# Patient Record
Sex: FEMALE | Race: WHITE | NOT HISPANIC OR LATINO | Employment: OTHER | URBAN - METROPOLITAN AREA
[De-identification: names, ages, dates, MRNs, and addresses within clinical notes are randomized per-mention and may not be internally consistent; named-entity substitution may affect disease eponyms.]

---

## 2023-02-27 ENCOUNTER — TELEPHONE (OUTPATIENT)
Dept: HEMATOLOGY/ONCOLOGY | Facility: CLINIC | Age: 68
End: 2023-02-27
Payer: MEDICARE

## 2023-02-27 DIAGNOSIS — R91.8 LUNG MASS: Primary | ICD-10-CM

## 2023-02-27 DIAGNOSIS — R91.8 LUNG MASS: ICD-10-CM

## 2023-02-28 NOTE — NURSING
Patient notified of the scheduled appointment with Dr. Arevalo.  Referral and recent PFTs uploaded to .  Images requested via fax to be powershared.  Patient has an appointment on 02/06/23 and will also request a copy of images on a disc.  Provided Ms. Ferro with my direct contact information.  Appointment reminder mailed.  Oncology Navigation   Intake  Date of Diagnosis: 02/13/23  Cancer Type: Thoracic  Internal / External Referral: External  Date of Referral: 02/27/23  Initial Nurse Navigator Contact: 02/27/23  Referral to Initial Contact Timeline (days): 0  Date Worked: 02/28/23  First Appointment Available: 03/03/23  Appointment Date: 03/08/23  First Available Date vs. Scheduled Date (days): 5  Multiple appointments: No  Reason if booked > 7 days after scheduling: Patient request; Additional tests/procedures     Treatment  Current Status: Active    Surgical Oncologist: Irina  Consult Date: 03/08/23    Medical Oncologist: Angela Khan       Procedures: CT; PET scan; Bronchoscopy  Bronchoscopy Schedule Date: 02/10/23  CT Schedule Date: 01/25/23  PET Scan Schedule Date: 02/03/23             Barriers of Care: Transportation  Transportation Barriers: lives in [place].     Acuity      Follow Up  No follow-ups on file.

## 2023-03-07 NOTE — PROGRESS NOTES
History & Physical    SUBJECTIVE:     History of Present Illness:  Patient is a 67 y.o. female smoker with obesity, COPD, chronic pain syndrome, lumbosacral radiculopathy, lupus anticoagulant disorder, FM, and RLS who presents to clinic today for evaluation of RLL Squamous Cell Carcinoma. Patient referred to us by Dr. Ring following workup for severe cough which included LDCT obtained following appointment on 01/10/23. LDCT 23 revealed RLL lung mass (previous LDCT screening 2020 no evidence of malignancy). PET/CT 23 revealed hypermetabolic RLL lung mass. Patient subsequently underwent transbronchial biopsy 02/10/23; path: histological and immunohistochemical findings consisted with squamous cell carcinoma. Today patient reports SINGLETON which she attributes to her current weight and ago. Also reports cough 2/2 to post-nasal drip. Denies fever, chills, diaphoresis, unintentional weight loss, syncope, dizziness, CP.      PSH: spinal cord stimulation x2 (most recent 2022), Radiofrequency ablation of nerves innervating SI joint, cholecystectomy, , tonsillectomy, colonoscopy, Lumbar fusion (titanium hardware, laminectomy  Meds: cyclobenzaprine, tizanidine, lyrica, topiramate, denies A/C    Chief Complaint   Patient presents with    Consult       Review of patient's allergies indicates:  No Known Allergies    Current Outpatient Medications   Medication Sig Dispense Refill    busPIRone (BUSPAR) 10 MG tablet Take 10 mg by mouth 3 (three) times daily.      cyclobenzaprine (FLEXERIL) 10 MG tablet Take 10 mg by mouth 3 (three) times daily.      pregabalin (LYRICA) 300 MG Cap Take 300 mg by mouth 3 (three) times daily.      tiZANidine 4 mg Cap Take 4 mg by mouth 2 (two) times daily.      topiramate (TOPAMAX) 50 MG tablet Take 50 mg by mouth 2 (two) times daily.       No current facility-administered medications for this visit.       No past medical history on file.  No past surgical history on file.  No  "family history on file.        Review of Systems:  Review of Systems   Constitutional: Negative.    HENT:  Positive for postnasal drip. Negative for sneezing, sore throat and trouble swallowing.    Eyes: Negative.    Respiratory:  Positive for cough (secondary to post-nasal drip) and shortness of breath (with exertion). Negative for choking, chest tightness and stridor.    Cardiovascular:  Negative for chest pain, palpitations and leg swelling.   Gastrointestinal: Negative.    Endocrine: Negative.    Genitourinary: Negative.    Musculoskeletal:  Positive for back pain. Negative for gait problem, joint swelling, myalgias and neck pain.   Skin: Negative.    Allergic/Immunologic: Negative.    Neurological: Negative.    Hematological: Negative.    Psychiatric/Behavioral: Negative.       OBJECTIVE:     Vital Signs (Most Recent)  Pulse: 87 (03/08/23 1127)  BP: 136/84 (03/08/23 1127)  SpO2: 95 % (03/08/23 1127)  5' 3" (1.6 m)  73.2 kg (161 lb 6 oz)     Physical Exam:  Physical Exam  Constitutional:       Appearance: Normal appearance.   Eyes:      Extraocular Movements: Extraocular movements intact.      Pupils: Pupils are equal, round, and reactive to light.   Cardiovascular:      Rate and Rhythm: Normal rate and regular rhythm.      Pulses: Normal pulses.   Pulmonary:      Effort: Pulmonary effort is normal.      Breath sounds: Normal breath sounds.   Abdominal:      General: Abdomen is flat.      Palpations: Abdomen is soft.   Skin:     General: Skin is warm and dry.   Neurological:      General: No focal deficit present.      Mental Status: She is alert and oriented to person, place, and time. Mental status is at baseline.   Psychiatric:         Mood and Affect: Mood normal.         Behavior: Behavior normal.         Thought Content: Thought content normal.       Diagnostic Results:    PFTs - 02/20/23: FEV1 2.24 99.2% DLCO 56.1%    LDCT 01/25/23: RLL lung mass     PET/CT 02/02/23: hypermetabolic RLL lung mass "     ASSESSMENT/PLAN:     Patient is a 67 y.o. female smoker with obesity, COPD, chronic pain syndrome, lumbosacral radiculopathy, lupus anticoagulant disorder, FM, and RLS who presents to clinic today for evaluation of RLL Squamous Cell Carcinoma.  Chronic pain syndrome places patient at high risk of refractory postoperative pain    PLAN:Plan   Recommend robotic right lower lobectomy  Obtained consents for blood and robotic assisted right lower lobectomy with MLND. I have discussed the technical aspects, risks and benefits of the procedure with the patient.  I did inform the patient that the risks are the most common risks and that there are other less likely risks that are too numerous to elaborate.  The patient is aware and has agreed to undergo the procedure as detailed on the consent form.   Will call with surgery date.   Order Cardiac Stress Test in [place]

## 2023-03-08 ENCOUNTER — OFFICE VISIT (OUTPATIENT)
Dept: CARDIOTHORACIC SURGERY | Facility: CLINIC | Age: 68
End: 2023-03-08
Payer: MEDICARE

## 2023-03-08 VITALS
BODY MASS INDEX: 28.59 KG/M2 | DIASTOLIC BLOOD PRESSURE: 84 MMHG | OXYGEN SATURATION: 95 % | HEART RATE: 87 BPM | HEIGHT: 63 IN | WEIGHT: 161.38 LBS | SYSTOLIC BLOOD PRESSURE: 136 MMHG

## 2023-03-08 DIAGNOSIS — Z01.810 PRE-OPERATIVE CARDIOVASCULAR EXAMINATION: Primary | ICD-10-CM

## 2023-03-08 DIAGNOSIS — C34.31 MALIGNANT NEOPLASM OF LOWER LOBE OF RIGHT LUNG: ICD-10-CM

## 2023-03-08 PROCEDURE — 99205 OFFICE O/P NEW HI 60 MIN: CPT | Mod: S$PBB,,, | Performed by: THORACIC SURGERY (CARDIOTHORACIC VASCULAR SURGERY)

## 2023-03-08 PROCEDURE — 99999 PR PBB SHADOW E&M-EST. PATIENT-LVL III: CPT | Mod: PBBFAC,,, | Performed by: THORACIC SURGERY (CARDIOTHORACIC VASCULAR SURGERY)

## 2023-03-08 PROCEDURE — 99205 PR OFFICE/OUTPT VISIT, NEW, LEVL V, 60-74 MIN: ICD-10-PCS | Mod: S$PBB,,, | Performed by: THORACIC SURGERY (CARDIOTHORACIC VASCULAR SURGERY)

## 2023-03-08 PROCEDURE — 99213 OFFICE O/P EST LOW 20 MIN: CPT | Mod: PBBFAC | Performed by: THORACIC SURGERY (CARDIOTHORACIC VASCULAR SURGERY)

## 2023-03-08 PROCEDURE — 99999 PR PBB SHADOW E&M-EST. PATIENT-LVL III: ICD-10-PCS | Mod: PBBFAC,,, | Performed by: THORACIC SURGERY (CARDIOTHORACIC VASCULAR SURGERY)

## 2023-03-08 RX ORDER — PREGABALIN 300 MG/1
300 CAPSULE ORAL 3 TIMES DAILY
COMMUNITY

## 2023-03-08 RX ORDER — TOPIRAMATE 50 MG/1
50 TABLET, FILM COATED ORAL 2 TIMES DAILY
COMMUNITY

## 2023-03-08 RX ORDER — BUSPIRONE HYDROCHLORIDE 10 MG/1
10 TABLET ORAL 3 TIMES DAILY
COMMUNITY

## 2023-03-08 RX ORDER — CYCLOBENZAPRINE HCL 10 MG
10 TABLET ORAL 3 TIMES DAILY
COMMUNITY
Start: 2023-01-20

## 2023-03-08 RX ORDER — TIZANIDINE HYDROCHLORIDE 4 MG/1
4 CAPSULE, GELATIN COATED ORAL 2 TIMES DAILY
COMMUNITY

## 2023-03-15 DIAGNOSIS — C34.31 MALIGNANT NEOPLASM OF LOWER LOBE OF RIGHT LUNG: Primary | ICD-10-CM

## 2023-03-24 ENCOUNTER — TELEPHONE (OUTPATIENT)
Dept: CARDIOTHORACIC SURGERY | Facility: CLINIC | Age: 68
End: 2023-03-24
Payer: MEDICARE

## 2023-03-24 NOTE — TELEPHONE ENCOUNTER
Informed on 3/24/23 patient underwent a left heart cath in [place] on 3/23/23. I have no documentation of this procedure at this time. Per patient, they did not have any areas to intervene on, recommended medical management and safe to proceed with surgery.     I told Ms. Ferro she could come on Monday however if I did not receive documentation or did receive documentation and we still had concerns we would cancel her procedure - with the understanding she is driving in town from [place].     She verbalized understanding. Awaiting response from the cardiologist. Per medical records the final procedure report is not completed.     Will check Monday Monday.     CB

## 2023-03-26 ENCOUNTER — ANESTHESIA EVENT (OUTPATIENT)
Dept: SURGERY | Facility: HOSPITAL | Age: 68
DRG: 164 | End: 2023-03-26
Payer: MEDICARE

## 2023-03-26 NOTE — ANESTHESIA PREPROCEDURE EVALUATION
[facility]  Anesthesia Pre-Operative Evaluation         Patient Name: Cyndi Vu  YOB: 1955  MRN: [id number]    SUBJECTIVE:     Pre-operative evaluation for Procedure(s) (LRB):  XI ROBOTIC RATS,WITH LOBECTOMY,LUNG (Right)  LYMPHADENECTOMY (Right)     03/26/2023    Cyndi Vu is a 67 y.o. female w/ a significant PMHx of COPD, lupus anticoagulant disorder, chronic low back pain, and fibromyalgia presented for evaluation of RLL SCS. Decision made to pursue RLL lobectomy.    PFTs - 02/20/23: FEV1 2.24 99.2% DLCO 56.1%    Patient now presents for the above procedure(s).       Prev airway: None documented.      There is no problem list on file for this patient.      Review of patient's allergies indicates:  No Known Allergies    Current Inpatient Medications:      No current facility-administered medications on file prior to encounter.     Current Outpatient Medications on File Prior to Encounter   Medication Sig Dispense Refill    busPIRone (BUSPAR) 10 MG tablet Take 10 mg by mouth 3 (three) times daily.      pregabalin (LYRICA) 300 MG Cap Take 300 mg by mouth 3 (three) times daily.      topiramate (TOPAMAX) 50 MG tablet Take 50 mg by mouth 2 (two) times daily. PATIENT TAKING ONLY AT NIGHT      cyclobenzaprine (FLEXERIL) 10 MG tablet Take 10 mg by mouth 3 (three) times daily.      tiZANidine 4 mg Cap Take 4 mg by mouth 2 (two) times daily.         No past surgical history on file.    Social History:  Tobacco Use: Not on file      Alcohol Use: Not on file        OBJECTIVE:     Vital Signs Range (Last 24H):         Significant Labs:  No results found for: WBC, HGB, HCT, PLT, CHOL, TRIG, HDL, LDLDIRECT, ALT, AST, NA, K, CL, CREATININE, BUN, CO2, TSH, PSA, INR, GLUF, HGBA1C, MICROALBUR    Diagnostic Studies: No relevant studies.    EKG:   No results found for this or any previous visit.    2D ECHO:  TTE:  No results found for this or any previous  visit.    THOMAS:  No results found for this or any previous visit.    ASSESSMENT/PLAN:           Pre-op Assessment    I have reviewed the Patient Summary Reports.     I have reviewed the Nursing Notes. I have reviewed the NPO Status.   I have reviewed the Medications.     Review of Systems  Anesthesia Hx:  No problems with previous Anesthesia  History of prior surgery of interest to airway management or planning:  Denies Personal Hx of Anesthesia complications.   Hematology/Oncology:  Hematology Normal      Current/Recent Cancer.   EENT/Dental:EENT/Dental Normal   Cardiovascular:  Cardiovascular Normal     Pulmonary:   COPD    Renal/:  Renal/ Normal     Hepatic/GI:  Hepatic/GI Normal    Musculoskeletal:  Spine Disorders: lumbar    Neurological:   Chronic Pain Syndrome   Endocrine:  Endocrine Normal        Physical Exam  General: Well nourished, Alert and Oriented    Airway:  Mallampati: III / II  Mouth Opening: Normal  TM Distance: Normal  Tongue: Normal  Neck ROM: Normal ROM    Dental:  Edentulous    Chest/Lungs:  Clear to auscultation, Normal Respiratory Rate    Heart:  Rate: Normal  Rhythm: Regular Rhythm  Sounds: Normal        Anesthesia Plan  Type of Anesthesia, risks & benefits discussed:    Anesthesia Type: Gen ETT  Intra-op Monitoring Plan: Standard ASA Monitors and Art Line  Post Op Pain Control Plan: multimodal analgesia and IV/PO Opioids PRN  Induction:  IV  Airway Plan: Direct, Post-Induction  Informed Consent: Informed consent signed with the Patient and all parties understand the risks and agree with anesthesia plan.  All questions answered.   ASA Score: 3  Day of Surgery Review of History & Physical: H&P Update referred to the surgeon/provider.    Ready For Surgery From Anesthesia Perspective.     .

## 2023-03-27 ENCOUNTER — HOSPITAL ENCOUNTER (INPATIENT)
Facility: HOSPITAL | Age: 68
LOS: 3 days | Discharge: HOME | DRG: 164 | End: 2023-03-30
Attending: THORACIC SURGERY (CARDIOTHORACIC VASCULAR SURGERY) | Admitting: THORACIC SURGERY (CARDIOTHORACIC VASCULAR SURGERY)
Payer: MEDICARE

## 2023-03-27 ENCOUNTER — ANESTHESIA (OUTPATIENT)
Dept: SURGERY | Facility: HOSPITAL | Age: 68
DRG: 164 | End: 2023-03-27
Payer: MEDICARE

## 2023-03-27 DIAGNOSIS — C34.91 SQUAMOUS CELL CARCINOMA LUNG, RIGHT: Primary | ICD-10-CM

## 2023-03-27 DIAGNOSIS — C34.31 MALIGNANT NEOPLASM OF LOWER LOBE OF RIGHT LUNG: Primary | ICD-10-CM

## 2023-03-27 DIAGNOSIS — C34.91 NSCLC OF RIGHT LUNG: ICD-10-CM

## 2023-03-27 LAB
ABO + RH BLD: NORMAL
BLD GP AB SCN CELLS X3 SERPL QL: NORMAL
CREAT SERPL-MCNC: 1.3 MG/DL (ref 0.5–1.4)
ERYTHROCYTE [DISTWIDTH] IN BLOOD BY AUTOMATED COUNT: 14.8 % (ref 11.5–14.5)
EST. GFR  (NO RACE VARIABLE): 45.6 ML/MIN/1.73 M^2
HCT VFR BLD AUTO: 39.8 % (ref 37–48.5)
HGB BLD-MCNC: 13.2 G/DL (ref 12–16)
MCH RBC QN AUTO: 31.1 PG (ref 27–31)
MCHC RBC AUTO-ENTMCNC: 33.2 G/DL (ref 32–36)
MCV RBC AUTO: 94 FL (ref 82–98)
PLATELET # BLD AUTO: 266 K/UL (ref 150–450)
PMV BLD AUTO: 9.7 FL (ref 9.2–12.9)
RBC # BLD AUTO: 4.25 M/UL (ref 4–5.4)
SPECIMEN OUTDATE: NORMAL
WBC # BLD AUTO: 8.36 K/UL (ref 3.9–12.7)

## 2023-03-27 PROCEDURE — 99900035 HC TECH TIME PER 15 MIN (STAT)

## 2023-03-27 PROCEDURE — 88307 PR  SURG PATH,LEVEL V: ICD-10-PCS | Mod: 26,,, | Performed by: PATHOLOGY

## 2023-03-27 PROCEDURE — 25000003 PHARM REV CODE 250: Performed by: STUDENT IN AN ORGANIZED HEALTH CARE EDUCATION/TRAINING PROGRAM

## 2023-03-27 PROCEDURE — 88309 TISSUE EXAM BY PATHOLOGIST: CPT | Mod: 26,,, | Performed by: PATHOLOGY

## 2023-03-27 PROCEDURE — 36000712 HC OR TIME LEV V 1ST 15 MIN: Performed by: THORACIC SURGERY (CARDIOTHORACIC VASCULAR SURGERY)

## 2023-03-27 PROCEDURE — 63600175 PHARM REV CODE 636 W HCPCS: Performed by: STUDENT IN AN ORGANIZED HEALTH CARE EDUCATION/TRAINING PROGRAM

## 2023-03-27 PROCEDURE — 25000003 PHARM REV CODE 250: Performed by: THORACIC SURGERY (CARDIOTHORACIC VASCULAR SURGERY)

## 2023-03-27 PROCEDURE — 32663 PR THORACOSCOPY SURG LOBECTOMY: ICD-10-PCS | Mod: RT,,, | Performed by: THORACIC SURGERY (CARDIOTHORACIC VASCULAR SURGERY)

## 2023-03-27 PROCEDURE — 36000713 HC OR TIME LEV V EA ADD 15 MIN: Performed by: THORACIC SURGERY (CARDIOTHORACIC VASCULAR SURGERY)

## 2023-03-27 PROCEDURE — 32663 THORACOSCOPY W/LOBECTOMY: CPT | Mod: RT,,, | Performed by: THORACIC SURGERY (CARDIOTHORACIC VASCULAR SURGERY)

## 2023-03-27 PROCEDURE — 32674 PR THORACOSCOPY LYMPH NODE EXC: ICD-10-PCS | Mod: AS,,, | Performed by: PHYSICIAN ASSISTANT

## 2023-03-27 PROCEDURE — 71000015 HC POSTOP RECOV 1ST HR: Performed by: THORACIC SURGERY (CARDIOTHORACIC VASCULAR SURGERY)

## 2023-03-27 PROCEDURE — 32674 PR THORACOSCOPY LYMPH NODE EXC: ICD-10-PCS | Mod: ,,, | Performed by: THORACIC SURGERY (CARDIOTHORACIC VASCULAR SURGERY)

## 2023-03-27 PROCEDURE — D9220A PRA ANESTHESIA: ICD-10-PCS | Mod: ANES,,, | Performed by: ANESTHESIOLOGY

## 2023-03-27 PROCEDURE — 27201423 OPTIME MED/SURG SUP & DEVICES STERILE SUPPLY: Performed by: THORACIC SURGERY (CARDIOTHORACIC VASCULAR SURGERY)

## 2023-03-27 PROCEDURE — 20600001 HC STEP DOWN PRIVATE ROOM

## 2023-03-27 PROCEDURE — 36620 ARTERIAL: ICD-10-PCS | Mod: 59,,, | Performed by: ANESTHESIOLOGY

## 2023-03-27 PROCEDURE — 36620 INSERTION CATHETER ARTERY: CPT | Mod: 59,,, | Performed by: ANESTHESIOLOGY

## 2023-03-27 PROCEDURE — 37000009 HC ANESTHESIA EA ADD 15 MINS: Performed by: THORACIC SURGERY (CARDIOTHORACIC VASCULAR SURGERY)

## 2023-03-27 PROCEDURE — 25000003 PHARM REV CODE 250: Performed by: NURSE ANESTHETIST, CERTIFIED REGISTERED

## 2023-03-27 PROCEDURE — 32674 THORACOSCOPY LYMPH NODE EXC: CPT | Mod: AS,,, | Performed by: PHYSICIAN ASSISTANT

## 2023-03-27 PROCEDURE — 25000003 PHARM REV CODE 250

## 2023-03-27 PROCEDURE — 36415 COLL VENOUS BLD VENIPUNCTURE: CPT | Performed by: THORACIC SURGERY (CARDIOTHORACIC VASCULAR SURGERY)

## 2023-03-27 PROCEDURE — 94761 N-INVAS EAR/PLS OXIMETRY MLT: CPT

## 2023-03-27 PROCEDURE — D9220A PRA ANESTHESIA: Mod: CRNA,,, | Performed by: NURSE ANESTHETIST, CERTIFIED REGISTERED

## 2023-03-27 PROCEDURE — 27000221 HC OXYGEN, UP TO 24 HOURS

## 2023-03-27 PROCEDURE — 88307 TISSUE EXAM BY PATHOLOGIST: CPT | Mod: 59 | Performed by: PATHOLOGY

## 2023-03-27 PROCEDURE — C1729 CATH, DRAINAGE: HCPCS | Performed by: THORACIC SURGERY (CARDIOTHORACIC VASCULAR SURGERY)

## 2023-03-27 PROCEDURE — 71000016 HC POSTOP RECOV ADDL HR: Performed by: THORACIC SURGERY (CARDIOTHORACIC VASCULAR SURGERY)

## 2023-03-27 PROCEDURE — 88309 PR  SURG PATH,LEVEL VI: ICD-10-PCS | Mod: 26,,, | Performed by: PATHOLOGY

## 2023-03-27 PROCEDURE — C9290 INJ, BUPIVACAINE LIPOSOME: HCPCS | Performed by: THORACIC SURGERY (CARDIOTHORACIC VASCULAR SURGERY)

## 2023-03-27 PROCEDURE — 32674 THORACOSCOPY LYMPH NODE EXC: CPT | Mod: ,,, | Performed by: THORACIC SURGERY (CARDIOTHORACIC VASCULAR SURGERY)

## 2023-03-27 PROCEDURE — 71000033 HC RECOVERY, INTIAL HOUR: Performed by: THORACIC SURGERY (CARDIOTHORACIC VASCULAR SURGERY)

## 2023-03-27 PROCEDURE — 88307 TISSUE EXAM BY PATHOLOGIST: CPT | Mod: 26,,, | Performed by: PATHOLOGY

## 2023-03-27 PROCEDURE — 99900031 HC PATIENT EDUCATION (STAT)

## 2023-03-27 PROCEDURE — D9220A PRA ANESTHESIA: Mod: ANES,,, | Performed by: ANESTHESIOLOGY

## 2023-03-27 PROCEDURE — 32663 THORACOSCOPY W/LOBECTOMY: CPT | Mod: AS,RT,, | Performed by: PHYSICIAN ASSISTANT

## 2023-03-27 PROCEDURE — 37000008 HC ANESTHESIA 1ST 15 MINUTES: Performed by: THORACIC SURGERY (CARDIOTHORACIC VASCULAR SURGERY)

## 2023-03-27 PROCEDURE — 82565 ASSAY OF CREATININE: CPT | Performed by: THORACIC SURGERY (CARDIOTHORACIC VASCULAR SURGERY)

## 2023-03-27 PROCEDURE — 63600175 PHARM REV CODE 636 W HCPCS: Performed by: NURSE ANESTHETIST, CERTIFIED REGISTERED

## 2023-03-27 PROCEDURE — 88309 TISSUE EXAM BY PATHOLOGIST: CPT | Performed by: PATHOLOGY

## 2023-03-27 PROCEDURE — 88313 SPECIAL STAINS GROUP 2: CPT | Performed by: PATHOLOGY

## 2023-03-27 PROCEDURE — D9220A PRA ANESTHESIA: ICD-10-PCS | Mod: CRNA,,, | Performed by: NURSE ANESTHETIST, CERTIFIED REGISTERED

## 2023-03-27 PROCEDURE — 32663 PR THORACOSCOPY SURG LOBECTOMY: ICD-10-PCS | Mod: AS,RT,, | Performed by: PHYSICIAN ASSISTANT

## 2023-03-27 PROCEDURE — 86900 BLOOD TYPING SEROLOGIC ABO: CPT

## 2023-03-27 PROCEDURE — 63600175 PHARM REV CODE 636 W HCPCS: Performed by: THORACIC SURGERY (CARDIOTHORACIC VASCULAR SURGERY)

## 2023-03-27 PROCEDURE — 85027 COMPLETE CBC AUTOMATED: CPT | Performed by: THORACIC SURGERY (CARDIOTHORACIC VASCULAR SURGERY)

## 2023-03-27 RX ORDER — FENTANYL CITRATE 50 UG/ML
25 INJECTION, SOLUTION INTRAMUSCULAR; INTRAVENOUS EVERY 5 MIN PRN
Status: DISCONTINUED | OUTPATIENT
Start: 2023-03-27 | End: 2023-03-27 | Stop reason: HOSPADM

## 2023-03-27 RX ORDER — DEXAMETHASONE SODIUM PHOSPHATE 4 MG/ML
INJECTION, SOLUTION INTRA-ARTICULAR; INTRALESIONAL; INTRAMUSCULAR; INTRAVENOUS; SOFT TISSUE
Status: DISCONTINUED | OUTPATIENT
Start: 2023-03-27 | End: 2023-03-28

## 2023-03-27 RX ORDER — ACETAMINOPHEN 500 MG
1000 TABLET ORAL EVERY 8 HOURS
Status: DISCONTINUED | OUTPATIENT
Start: 2023-03-27 | End: 2023-03-30 | Stop reason: HOSPADM

## 2023-03-27 RX ORDER — BUPIVACAINE HYDROCHLORIDE 2.5 MG/ML
INJECTION, SOLUTION EPIDURAL; INFILTRATION; INTRACAUDAL
Status: DISCONTINUED | OUTPATIENT
Start: 2023-03-27 | End: 2023-03-27

## 2023-03-27 RX ORDER — PROPOFOL 10 MG/ML
VIAL (ML) INTRAVENOUS
Status: DISCONTINUED | OUTPATIENT
Start: 2023-03-27 | End: 2023-03-28

## 2023-03-27 RX ORDER — METOCLOPRAMIDE HYDROCHLORIDE 5 MG/ML
5 INJECTION INTRAMUSCULAR; INTRAVENOUS EVERY 6 HOURS PRN
Status: DISCONTINUED | OUTPATIENT
Start: 2023-03-27 | End: 2023-03-30 | Stop reason: HOSPADM

## 2023-03-27 RX ORDER — MIDAZOLAM HYDROCHLORIDE 1 MG/ML
INJECTION INTRAMUSCULAR; INTRAVENOUS
Status: DISCONTINUED | OUTPATIENT
Start: 2023-03-27 | End: 2023-03-28

## 2023-03-27 RX ORDER — KETAMINE HCL IN 0.9 % NACL 50 MG/5 ML
SYRINGE (ML) INTRAVENOUS
Status: DISCONTINUED | OUTPATIENT
Start: 2023-03-27 | End: 2023-03-28

## 2023-03-27 RX ORDER — SODIUM CHLORIDE 9 MG/ML
INJECTION, SOLUTION INTRAVENOUS CONTINUOUS
Status: DISCONTINUED | OUTPATIENT
Start: 2023-03-27 | End: 2023-03-27

## 2023-03-27 RX ORDER — DEXMEDETOMIDINE HYDROCHLORIDE 100 UG/ML
INJECTION, SOLUTION INTRAVENOUS
Status: DISCONTINUED | OUTPATIENT
Start: 2023-03-27 | End: 2023-03-28

## 2023-03-27 RX ORDER — LIDOCAINE HYDROCHLORIDE 10 MG/ML
1 INJECTION, SOLUTION EPIDURAL; INFILTRATION; INTRACAUDAL; PERINEURAL ONCE
Status: DISCONTINUED | OUTPATIENT
Start: 2023-03-27 | End: 2023-03-27

## 2023-03-27 RX ORDER — PREGABALIN 150 MG/1
300 CAPSULE ORAL 3 TIMES DAILY
Status: DISCONTINUED | OUTPATIENT
Start: 2023-03-27 | End: 2023-03-30 | Stop reason: HOSPADM

## 2023-03-27 RX ORDER — OXYCODONE HYDROCHLORIDE 5 MG/1
5 TABLET ORAL EVERY 4 HOURS PRN
Status: DISCONTINUED | OUTPATIENT
Start: 2023-03-27 | End: 2023-03-30 | Stop reason: HOSPADM

## 2023-03-27 RX ORDER — ROCURONIUM BROMIDE 10 MG/ML
INJECTION, SOLUTION INTRAVENOUS
Status: DISCONTINUED | OUTPATIENT
Start: 2023-03-27 | End: 2023-03-28

## 2023-03-27 RX ORDER — LIDOCAINE HYDROCHLORIDE 20 MG/ML
INJECTION INTRAVENOUS
Status: DISCONTINUED | OUTPATIENT
Start: 2023-03-27 | End: 2023-03-28

## 2023-03-27 RX ORDER — CEFAZOLIN SODIUM 1 G/3ML
INJECTION, POWDER, FOR SOLUTION INTRAMUSCULAR; INTRAVENOUS
Status: DISCONTINUED | OUTPATIENT
Start: 2023-03-27 | End: 2023-03-28

## 2023-03-27 RX ORDER — FENTANYL CITRATE 50 UG/ML
INJECTION, SOLUTION INTRAMUSCULAR; INTRAVENOUS
Status: DISCONTINUED | OUTPATIENT
Start: 2023-03-27 | End: 2023-03-28

## 2023-03-27 RX ORDER — ACETAMINOPHEN 500 MG
1000 TABLET ORAL
Status: COMPLETED | OUTPATIENT
Start: 2023-03-27 | End: 2023-03-27

## 2023-03-27 RX ORDER — ONDANSETRON 8 MG/1
8 TABLET, ORALLY DISINTEGRATING ORAL EVERY 8 HOURS PRN
Status: DISCONTINUED | OUTPATIENT
Start: 2023-03-27 | End: 2023-03-30 | Stop reason: HOSPADM

## 2023-03-27 RX ORDER — METHOCARBAMOL 500 MG/1
500 TABLET, FILM COATED ORAL 4 TIMES DAILY
Status: DISCONTINUED | OUTPATIENT
Start: 2023-03-27 | End: 2023-03-30 | Stop reason: HOSPADM

## 2023-03-27 RX ORDER — PHENYLEPHRINE HCL IN 0.9% NACL 1 MG/10 ML
SYRINGE (ML) INTRAVENOUS
Status: DISCONTINUED | OUTPATIENT
Start: 2023-03-27 | End: 2023-03-28

## 2023-03-27 RX ORDER — HYDROMORPHONE HYDROCHLORIDE 1 MG/ML
0.2 INJECTION, SOLUTION INTRAMUSCULAR; INTRAVENOUS; SUBCUTANEOUS EVERY 5 MIN PRN
Status: DISCONTINUED | OUTPATIENT
Start: 2023-03-27 | End: 2023-03-27 | Stop reason: HOSPADM

## 2023-03-27 RX ORDER — OXYCODONE HYDROCHLORIDE 10 MG/1
10 TABLET ORAL EVERY 4 HOURS PRN
Status: DISCONTINUED | OUTPATIENT
Start: 2023-03-27 | End: 2023-03-30 | Stop reason: HOSPADM

## 2023-03-27 RX ORDER — ONDANSETRON 2 MG/ML
INJECTION INTRAMUSCULAR; INTRAVENOUS
Status: DISCONTINUED | OUTPATIENT
Start: 2023-03-27 | End: 2023-03-28

## 2023-03-27 RX ORDER — SODIUM CHLORIDE 0.9 % (FLUSH) 0.9 %
10 SYRINGE (ML) INJECTION
Status: DISCONTINUED | OUTPATIENT
Start: 2023-03-27 | End: 2023-03-27 | Stop reason: HOSPADM

## 2023-03-27 RX ORDER — BUSPIRONE HYDROCHLORIDE 10 MG/1
10 TABLET ORAL 3 TIMES DAILY
Status: DISCONTINUED | OUTPATIENT
Start: 2023-03-27 | End: 2023-03-30 | Stop reason: HOSPADM

## 2023-03-27 RX ORDER — HALOPERIDOL 5 MG/ML
0.5 INJECTION INTRAMUSCULAR EVERY 10 MIN PRN
Status: DISCONTINUED | OUTPATIENT
Start: 2023-03-27 | End: 2023-03-27 | Stop reason: HOSPADM

## 2023-03-27 RX ORDER — GABAPENTIN 300 MG/1
300 CAPSULE ORAL NIGHTLY
Status: DISCONTINUED | OUTPATIENT
Start: 2023-03-27 | End: 2023-03-27

## 2023-03-27 RX ORDER — MUPIROCIN 20 MG/G
1 OINTMENT TOPICAL 2 TIMES DAILY
Status: DISCONTINUED | OUTPATIENT
Start: 2023-03-27 | End: 2023-03-30 | Stop reason: HOSPADM

## 2023-03-27 RX ORDER — ENOXAPARIN SODIUM 100 MG/ML
40 INJECTION SUBCUTANEOUS EVERY 24 HOURS
Status: DISCONTINUED | OUTPATIENT
Start: 2023-03-28 | End: 2023-03-30 | Stop reason: HOSPADM

## 2023-03-27 RX ADMIN — ROCURONIUM BROMIDE 10 MG: 10 INJECTION, SOLUTION INTRAVENOUS at 02:03

## 2023-03-27 RX ADMIN — DEXMEDETOMIDINE HYDROCHLORIDE 4 MCG: 100 INJECTION, SOLUTION INTRAVENOUS at 02:03

## 2023-03-27 RX ADMIN — Medication 200 MCG: at 01:03

## 2023-03-27 RX ADMIN — HYDROMORPHONE HYDROCHLORIDE 0.2 MG: 1 INJECTION, SOLUTION INTRAMUSCULAR; INTRAVENOUS; SUBCUTANEOUS at 06:03

## 2023-03-27 RX ADMIN — MIDAZOLAM HYDROCHLORIDE 1 MG: 1 INJECTION INTRAMUSCULAR; INTRAVENOUS at 12:03

## 2023-03-27 RX ADMIN — PROPOFOL 40 MG: 10 INJECTION, EMULSION INTRAVENOUS at 01:03

## 2023-03-27 RX ADMIN — PROPOFOL 40 MG: 10 INJECTION, EMULSION INTRAVENOUS at 02:03

## 2023-03-27 RX ADMIN — ONDANSETRON 4 MG: 2 INJECTION INTRAMUSCULAR; INTRAVENOUS at 05:03

## 2023-03-27 RX ADMIN — DEXAMETHASONE SODIUM PHOSPHATE 8 MG: 4 INJECTION, SOLUTION INTRAMUSCULAR; INTRAVENOUS at 01:03

## 2023-03-27 RX ADMIN — Medication 100 MCG: at 01:03

## 2023-03-27 RX ADMIN — SUGAMMADEX 200 MG: 100 INJECTION, SOLUTION INTRAVENOUS at 05:03

## 2023-03-27 RX ADMIN — CEFAZOLIN 1 G: 330 INJECTION, POWDER, FOR SOLUTION INTRAMUSCULAR; INTRAVENOUS at 05:03

## 2023-03-27 RX ADMIN — SODIUM CHLORIDE: 0.9 INJECTION, SOLUTION INTRAVENOUS at 12:03

## 2023-03-27 RX ADMIN — ROCURONIUM BROMIDE 20 MG: 10 INJECTION, SOLUTION INTRAVENOUS at 03:03

## 2023-03-27 RX ADMIN — OXYCODONE HYDROCHLORIDE 10 MG: 10 TABLET ORAL at 06:03

## 2023-03-27 RX ADMIN — DEXMEDETOMIDINE HYDROCHLORIDE 8 MCG: 100 INJECTION, SOLUTION INTRAVENOUS at 01:03

## 2023-03-27 RX ADMIN — ROCURONIUM BROMIDE 40 MG: 10 INJECTION, SOLUTION INTRAVENOUS at 01:03

## 2023-03-27 RX ADMIN — SODIUM CHLORIDE, SODIUM GLUCONATE, SODIUM ACETATE, POTASSIUM CHLORIDE, MAGNESIUM CHLORIDE, SODIUM PHOSPHATE, DIBASIC, AND POTASSIUM PHOSPHATE: .53; .5; .37; .037; .03; .012; .00082 INJECTION, SOLUTION INTRAVENOUS at 01:03

## 2023-03-27 RX ADMIN — ACETAMINOPHEN 1000 MG: 500 TABLET ORAL at 10:03

## 2023-03-27 RX ADMIN — DEXMEDETOMIDINE HYDROCHLORIDE 8 MCG: 100 INJECTION, SOLUTION INTRAVENOUS at 05:03

## 2023-03-27 RX ADMIN — FENTANYL CITRATE 50 MCG: 50 INJECTION, SOLUTION INTRAMUSCULAR; INTRAVENOUS at 02:03

## 2023-03-27 RX ADMIN — Medication 10 MG: at 04:03

## 2023-03-27 RX ADMIN — Medication 20 MG: at 01:03

## 2023-03-27 RX ADMIN — GABAPENTIN 400 MG: 300 CAPSULE ORAL at 10:03

## 2023-03-27 RX ADMIN — ROCURONIUM BROMIDE 20 MG: 10 INJECTION, SOLUTION INTRAVENOUS at 04:03

## 2023-03-27 RX ADMIN — SODIUM CHLORIDE: 9 INJECTION, SOLUTION INTRAVENOUS at 10:03

## 2023-03-27 RX ADMIN — LIDOCAINE HYDROCHLORIDE 80 MG: 20 INJECTION INTRAVENOUS at 01:03

## 2023-03-27 RX ADMIN — CEFAZOLIN 2 G: 330 INJECTION, POWDER, FOR SOLUTION INTRAMUSCULAR; INTRAVENOUS at 01:03

## 2023-03-27 RX ADMIN — Medication 100 MCG: at 03:03

## 2023-03-27 RX ADMIN — Medication 10 MG: at 03:03

## 2023-03-27 RX ADMIN — PROPOFOL 200 MG: 10 INJECTION, EMULSION INTRAVENOUS at 01:03

## 2023-03-27 RX ADMIN — Medication 10 MG: at 02:03

## 2023-03-27 RX ADMIN — FENTANYL CITRATE 50 MCG: 50 INJECTION, SOLUTION INTRAMUSCULAR; INTRAVENOUS at 01:03

## 2023-03-27 NOTE — TRANSFER OF CARE
"Anesthesia Transfer of Care Note    Patient: Cyndi Vu    Procedure(s) Performed: Procedure(s) (LRB):  XI ROBOTIC RATS,WITH LOBECTOMY,LUNG (Right)  LYMPHADENECTOMY (Right)  BLOCK, NERVE, INTERCOSTAL, 2 OR MORE (Right)    Patient location: PACU    Anesthesia Type: general    Transport from OR: Transported from OR on 6-10 L/min O2 by face mask with adequate spontaneous ventilation    Post pain: adequate analgesia    Post assessment: no apparent anesthetic complications    Post vital signs: stable    Level of consciousness: awake    Nausea/Vomiting: no nausea/vomiting    Complications: none    Transfer of care protocol was followed      Last vitals:   Visit Vitals  /72 (BP Location: Left arm, Patient Position: Lying)   Pulse 80   Temp 36.1 °C (97 °F) (Temporal)   Resp 20   Ht 5' 3" (1.6 m)   Wt 72.6 kg (160 lb)   SpO2 100%   Breastfeeding No   BMI 28.34 kg/m²     "

## 2023-03-27 NOTE — INTERVAL H&P NOTE
The patient has been examined and the H&P has been reviewed:    I concur with the findings and changes have been noted since the H&P was written: Patient underwent left heart cath one week ago, no intervention was performed per review of records she was deemed intermediate cardiac risk but no factors were present to prohibit her surgery from proceeding. She has not started any anti-platelets or anti-coagulants.    Surgery risks, benefits and alternative options discussed and understood by patient/family.          There are no hospital problems to display for this patient.

## 2023-03-27 NOTE — ANESTHESIA PROCEDURE NOTES
Arterial    Diagnosis: Lung SCS    Patient location during procedure: done in OR    Staffing  Authorizing Provider: GRETCHEN Scherer MD  Performing Provider: Kar Wise MD    Anesthesiologist was present at the time of the procedure.    Preanesthetic Checklist  Completed: patient identified, IV checked, site marked, risks and benefits discussed, surgical consent, monitors and equipment checked, pre-op evaluation, timeout performed and anesthesia consent givenArterial  Skin Prep: chlorhexidine gluconate  Local Infiltration: none  Orientation: left  Location: radial    Catheter Size: 20 G  Catheter placement by Anatomical landmarks. Heme positive aspiration all ports. Insertion Attempts: 2  Assessment  Dressing: secured with tape and tegaderm  Patient: Tolerated well

## 2023-03-27 NOTE — ANESTHESIA PROCEDURE NOTES
Intubation    Date/Time: 3/27/2023 1:05 PM  Performed by: Kar Wise MD  Authorized by: GRETCHEN Scherer MD     Intubation:     Induction:  Intravenous    Intubated:  Postinduction    Mask Ventilation:  Easy mask    Attempts:  1    Attempted By:  Resident anesthesiologist    Method of Intubation:  Video laryngoscopy    Blade:  Onofre 3    Laryngeal View Grade: Grade I - full view of cords      Difficult Airway Encountered?: No      Complications:  None    Airway Device:  Double lumen tube left    Airway Device Size:  37F    Style/Cuff Inflation:  Cuffed (inflated to minimal occlusive pressure)    Secured at:  The lips    Placement Verified By:  Capnometry, Revisualization with laryngoscopy and Fiber optic visualization    Complicating Factors:  None    Findings Post-Intubation:  BS equal bilateral and atraumatic/condition of teeth unchanged

## 2023-03-28 PROBLEM — C34.91 SQUAMOUS CELL CARCINOMA LUNG, RIGHT: Status: ACTIVE | Noted: 2023-03-28

## 2023-03-28 LAB
ANION GAP SERPL CALC-SCNC: 8 MMOL/L (ref 8–16)
BUN SERPL-MCNC: 15 MG/DL (ref 8–23)
CALCIUM SERPL-MCNC: 8 MG/DL (ref 8.7–10.5)
CHLORIDE SERPL-SCNC: 112 MMOL/L (ref 95–110)
CO2 SERPL-SCNC: 19 MMOL/L (ref 23–29)
CREAT SERPL-MCNC: 1.2 MG/DL (ref 0.5–1.4)
EST. GFR  (NO RACE VARIABLE): 50.2 ML/MIN/1.73 M^2
GLUCOSE SERPL-MCNC: 118 MG/DL (ref 70–110)
POTASSIUM SERPL-SCNC: 4.5 MMOL/L (ref 3.5–5.1)
SODIUM SERPL-SCNC: 139 MMOL/L (ref 136–145)

## 2023-03-28 PROCEDURE — 36415 COLL VENOUS BLD VENIPUNCTURE: CPT | Performed by: PHYSICIAN ASSISTANT

## 2023-03-28 PROCEDURE — 25000003 PHARM REV CODE 250: Performed by: STUDENT IN AN ORGANIZED HEALTH CARE EDUCATION/TRAINING PROGRAM

## 2023-03-28 PROCEDURE — 25000242 PHARM REV CODE 250 ALT 637 W/ HCPCS: Performed by: STUDENT IN AN ORGANIZED HEALTH CARE EDUCATION/TRAINING PROGRAM

## 2023-03-28 PROCEDURE — 20600001 HC STEP DOWN PRIVATE ROOM

## 2023-03-28 PROCEDURE — 94761 N-INVAS EAR/PLS OXIMETRY MLT: CPT

## 2023-03-28 PROCEDURE — 99900035 HC TECH TIME PER 15 MIN (STAT)

## 2023-03-28 PROCEDURE — 63600175 PHARM REV CODE 636 W HCPCS: Performed by: STUDENT IN AN ORGANIZED HEALTH CARE EDUCATION/TRAINING PROGRAM

## 2023-03-28 PROCEDURE — 27000221 HC OXYGEN, UP TO 24 HOURS

## 2023-03-28 PROCEDURE — 80048 BASIC METABOLIC PNL TOTAL CA: CPT | Performed by: PHYSICIAN ASSISTANT

## 2023-03-28 PROCEDURE — 94640 AIRWAY INHALATION TREATMENT: CPT

## 2023-03-28 RX ORDER — FUROSEMIDE 10 MG/ML
20 INJECTION INTRAMUSCULAR; INTRAVENOUS ONCE
Status: COMPLETED | OUTPATIENT
Start: 2023-03-28 | End: 2023-03-28

## 2023-03-28 RX ORDER — HYDROMORPHONE HYDROCHLORIDE 1 MG/ML
0.5 INJECTION, SOLUTION INTRAMUSCULAR; INTRAVENOUS; SUBCUTANEOUS EVERY 6 HOURS PRN
Status: DISCONTINUED | OUTPATIENT
Start: 2023-03-28 | End: 2023-03-30 | Stop reason: HOSPADM

## 2023-03-28 RX ORDER — LEVALBUTEROL 1.25 MG/.5ML
1.25 SOLUTION, CONCENTRATE RESPIRATORY (INHALATION) EVERY 6 HOURS PRN
Status: DISCONTINUED | OUTPATIENT
Start: 2023-03-28 | End: 2023-03-30 | Stop reason: HOSPADM

## 2023-03-28 RX ADMIN — METHOCARBAMOL 500 MG: 500 TABLET ORAL at 09:03

## 2023-03-28 RX ADMIN — OXYCODONE HYDROCHLORIDE 10 MG: 10 TABLET ORAL at 03:03

## 2023-03-28 RX ADMIN — BUSPIRONE HYDROCHLORIDE 10 MG: 10 TABLET ORAL at 08:03

## 2023-03-28 RX ADMIN — METHOCARBAMOL 500 MG: 500 TABLET ORAL at 12:03

## 2023-03-28 RX ADMIN — OXYCODONE HYDROCHLORIDE 10 MG: 10 TABLET ORAL at 04:03

## 2023-03-28 RX ADMIN — MUPIROCIN 1 G: 20 OINTMENT TOPICAL at 08:03

## 2023-03-28 RX ADMIN — MUPIROCIN 1 G: 20 OINTMENT TOPICAL at 09:03

## 2023-03-28 RX ADMIN — OXYCODONE HYDROCHLORIDE 10 MG: 10 TABLET ORAL at 09:03

## 2023-03-28 RX ADMIN — ENOXAPARIN SODIUM 40 MG: 40 INJECTION SUBCUTANEOUS at 08:03

## 2023-03-28 RX ADMIN — METHOCARBAMOL 500 MG: 500 TABLET ORAL at 08:03

## 2023-03-28 RX ADMIN — METHOCARBAMOL 500 MG: 500 TABLET ORAL at 04:03

## 2023-03-28 RX ADMIN — ACETAMINOPHEN 1000 MG: 500 TABLET ORAL at 03:03

## 2023-03-28 RX ADMIN — OXYCODONE HYDROCHLORIDE 10 MG: 10 TABLET ORAL at 12:03

## 2023-03-28 RX ADMIN — BUSPIRONE HYDROCHLORIDE 10 MG: 10 TABLET ORAL at 09:03

## 2023-03-28 RX ADMIN — PREGABALIN 300 MG: 150 CAPSULE ORAL at 03:03

## 2023-03-28 RX ADMIN — FUROSEMIDE 20 MG: 10 INJECTION, SOLUTION INTRAMUSCULAR; INTRAVENOUS at 08:03

## 2023-03-28 RX ADMIN — LEVALBUTEROL 1.25 MG: 1.25 SOLUTION, CONCENTRATE RESPIRATORY (INHALATION) at 05:03

## 2023-03-28 RX ADMIN — PREGABALIN 300 MG: 150 CAPSULE ORAL at 09:03

## 2023-03-28 RX ADMIN — PREGABALIN 300 MG: 150 CAPSULE ORAL at 08:03

## 2023-03-28 RX ADMIN — ACETAMINOPHEN 1000 MG: 500 TABLET ORAL at 09:03

## 2023-03-28 RX ADMIN — ACETAMINOPHEN 1000 MG: 500 TABLET ORAL at 06:03

## 2023-03-28 RX ADMIN — BUSPIRONE HYDROCHLORIDE 10 MG: 10 TABLET ORAL at 03:03

## 2023-03-28 NOTE — NURSING TRANSFER
Nursing Transfer Note      3/27/2023     Reason patient is being transferred: postop    Transfer To: 1042    Transfer via bed    Transfer with 2 L NC to O2, chest tube x2    Transported by PCT, RN    Medicines sent: n/a    Any special needs or follow-up needed: site/drain care, pain management    Chart send with patient: Yes    Notified: daughter    Patient reassessed at: 3/27 1955

## 2023-03-28 NOTE — ASSESSMENT & PLAN NOTE
67F with history of COPD, lupus anti-coagulant disorder, NSCLC of the right lung who is s/p robotic right lower lobectomy on 3/27/23.    - Regular diet  - Will remove regular CT today. Keep other remaining CT to water seal  - PRN pain control, multi modal  - Daily CXR  - One time dose of lasix 20 this AM will reassess this PM for additional diuresis.  - D/C lugo  - Home meds reviewed and reconciled as appropriate   - Lovenox, SCDs  - Needs to be up and ambulating today

## 2023-03-28 NOTE — OP NOTE
[facility] - Surgery ([facility])  Surgery Department  Operative Note    SUMMARY     Date of Procedure: 3/27/2023     Procedure: Procedure(s) (LRB):  XI ROBOTIC RATS,WITH LOBECTOMY,LUNG (Right)  LYMPHADENECTOMY (Right)  BLOCK, NERVE, INTERCOSTAL, 2 OR MORE (Right)     Surgeon(s) and Role:     * El Arevalo MD - Primary     * Mackenzie Steele MD - Fellow    Assisting Surgeon: None    Pre-Operative Diagnosis: Malignant neoplasm of lower lobe of right lung [C34.31]    Post-Operative Diagnosis: Post-Op Diagnosis Codes:     * Malignant neoplasm of lower lobe of right lung [C34.31]    Anesthesia: General    Procedure:  Patient was brought to the operating room and placed supine on the operating table. General anesthesia was induced without complication. A double lumen endotracheal tube was placed and position confirmed bronchoscopically. The patient was placed in left lateral decubitus position and padded appropriately. The right chest was prepped and draped. A time out was performed and single lung ventilation was initiated.   An 8mm port was placed in the 8th intercostal space, midaxillary line. No injury was noted on entry. A six level intercostal nerve block was performed with an exparel/marcaine cocktail. Two additional 8mm trocars, a 12mm trocar, and a 12mm assistant port were placed under direct visualization. The robot was docked.   The inferior pulmonary ligament was divided. The lung was retracted anteriorly and level 7 subcarinal lymph nodes were harvested. The level 2 and 4 lymph nodes were also harvested and nuknit was placed in the dissection bed.  The interlobar pulmonary artery was identified and interlobar nodes harvested. The oblique fissure was completed with multiple fires of the blue load robotic stapler. The basilar and superior segmental branches of the pulmonary artery were dissected and divided with a vascular load of the robotic stapler. The inferior pulmonary vein was isolated and divided  using a vascular load of the robotic stapler. The right lower lobe bronchus was isolated and divided using a green staple load following a clamp trial. Due to significant serous drainage during the early phase of the case, 30ml of cream were placed down the orogastric tube to attempt to localize a chyle leak if it were present. No chyle leak was noted but we chose to clip the thoracic duct prophylactically.The anterior port was extended to a allow access for an endocatch bag and the specimen was removed through this incision. A chest tube was placed and secured at the skin with a silk suture. The lungs were reinflated under direct visualization. The incisions were closed in multiple layers with absorbable suture and sterile dressings were applied.   The patient was extubated in the OR and taken to PACU in stable condition.      Estimated Blood Loss (EBL): 30ml           Implants: * No implants in log *    Specimens:   Specimen (24h ago, onward)       Start     Ordered    03/27/23 1737  Specimen to Pathology, Surgery Pulmonary and Thoracic  Once        Comments: Pre-op Diagnosis: Malignant neoplasm of lower lobe of right lung [C34.31]Procedure(s):XI ROBOTIC RATS,WITH LOBECTOMY,LUNGLYMPHADENECTOMY Number of specimens: 6Name of specimens: 1) level 7 packet-perm2) level 2 and 4 packet-perm3) anterior level 11 packet between RML and RLL-perm4) posterior level 11 sump node-perm5) right level 12-perm6)right lower lobe-perm     References:    Click here for ordering Quick Tip   Question Answer Comment   Procedure Type: Pulmonary and Thoracic    Specimen Class: Routine/Screening    Which provider would you like to cc? RADHA SCHAFFER    Release to patient Immediate        03/27/23 1737                            Condition: Good    Disposition: PACU - hemodynamically stable.    Mackenzie Steele MD  Cardiothoracic Surgery Fellow   [phone]    Bedside assistant attestation: Selin Che PA-C functioned as a bedside 1st  assistant.  Her duties included robotic docking, instrument exchange, and specimen retrieval throughout the entire surgery.  There was no qualified resident available to function as a bedside first assistant given the case complexity and extent of duties described above.     Attending attestation: I was present for and either directly assisted with or performed the critical and key portions of the procedure.     Thoracic (Pulmonary) Cancer - Synoptic Operative Report Summary    Side of surgery Right   Surgical approach Robotic   Tumor type NSCLC - Squamous   Which lymph nodes were submitted as separate specimens? 2R - Upper Paratracheal (right), 4R - Lower Paratracheal (right), 7 - Subcarinal, and 11R - Interlobar (right)   What pre-operative therapies did the patient receive? None

## 2023-03-28 NOTE — ANESTHESIA POSTPROCEDURE EVALUATION
Anesthesia Post Evaluation    Patient: Cyndi Vu    Procedure(s) Performed: Procedure(s) (LRB):  XI ROBOTIC RATS,WITH LOBECTOMY,LUNG (Right)  LYMPHADENECTOMY (Right)  BLOCK, NERVE, INTERCOSTAL, 2 OR MORE (Right)    Final Anesthesia Type: general      Patient location during evaluation: PACU  Patient participation: Yes- Able to Participate  Level of consciousness: awake  Post-procedure vital signs: reviewed and stable  Pain management: adequate  Airway patency: patent    PONV status at discharge: No PONV  Anesthetic complications: no      Cardiovascular status: blood pressure returned to baseline  Respiratory status: unassisted, spontaneous ventilation and room air            Vitals Value Taken Time   /86 03/27/23 2040   Temp 36.2 °C (97.1 °F) 03/27/23 2040   Pulse 87 03/27/23 2040   Resp 16 03/27/23 2040   SpO2 91 % 03/27/23 2040         Event Time   Out of Recovery 18:15:00         Pain/Brandan Score: Pain Rating Prior to Med Admin: 7 (3/27/2023  6:45 PM)  Pain Rating Post Med Admin: 0 (3/27/2023  7:45 PM)  Brandan Score: 9 (3/27/2023  6:15 PM)

## 2023-03-28 NOTE — PLAN OF CARE
[facility]  Initial Discharge Assessment       Primary Care Provider: Jazmine Chow MD    Admission Diagnosis: Malignant neoplasm of lower lobe of right lung [C34.31]  NSCLC of right lung [C34.91]    Admission Date: 3/27/2023  Expected Discharge Date:     Discharge Barriers Identified: None    Payor: MEDICARE / Plan: MEDICARE PART A & B / Product Type: Government /     Extended Emergency Contact Information  Primary Emergency Contact: Lucinda Coronado  Mobile Phone: [phone]  Relation: Daughter  Preferred language: English   needed? No    Discharge Plan A: Home  Discharge Plan B: Home Health    No Pharmacies Listed    Initial Assessment (most recent)       Adult Discharge Assessment - 03/28/23 1505          Discharge Assessment    Assessment Type Discharge Planning Brief Assessment     Confirmed/corrected address, phone number and insurance Yes     Confirmed Demographics Correct on Facesheet     Source of Information patient     When was your last doctors appointment? 02/13/23     Does patient/caregiver understand observation status Yes     Communicated FRANSISCO with patient/caregiver Yes     Reason For Admission Malignant neoplasm of lo*     People in Home parent(s)     Facility Arrived From: home     Do you expect to return to your current living situation? Yes     Do you have help at home or someone to help you manage your care at home? Yes     Who are your caregiver(s) and their phone number(s)? Lucinda-[phone]     Prior to hospitilization cognitive status: Alert/Oriented     Current cognitive status: Alert/Oriented     Home Layout Able to live on 1st floor     Equipment Currently Used at Home none     Readmission within 30 days? No     Patient currently being followed by outpatient case management? No     Do you currently have service(s) that help you manage your care at home? No     Do you take prescription medications? Yes     Do you have prescription coverage? Yes     Coverage  Medicare     Do you have any problems affording any of your prescribed medications? No     Is the patient taking medications as prescribed? yes     Who is going to help you get home at discharge? Lucinda-[phone]     How do you get to doctors appointments? car, drives self     Are you on dialysis? No     Do you take coumadin? No     Discharge Plan A Home     Discharge Plan B Home Health     DME Needed Upon Discharge  none     Discharge Plan discussed with: Patient     Discharge Barriers Identified None        Physical Activity    On average, how many days per week do you engage in moderate to strenuous exercise (like a brisk walk)? 7 days     On average, how many minutes do you engage in exercise at this level? 30 min        Financial Resource Strain    How hard is it for you to pay for the very basics like food, housing, medical care, and heating? Not very hard        Housing Stability    In the last 12 months, was there a time when you were not able to pay the mortgage or rent on time? No     In the last 12 months, was there a time when you did not have a steady place to sleep or slept in a shelter (including now)? No        Transportation Needs    In the past 12 months, has lack of transportation kept you from medical appointments or from getting medications? No     In the past 12 months, has lack of transportation kept you from meetings, work, or from getting things needed for daily living? No        Food Insecurity    Within the past 12 months, you worried that your food would run out before you got the money to buy more. Never true     Within the past 12 months, the food you bought just didn't last and you didn't have money to get more. Never true        Stress    Do you feel stress - tense, restless, nervous, or anxious, or unable to sleep at night because your mind is troubled all the time - these days? To some extent        Social Connections    In a typical week, how many times do you talk on  the phone with family, friends, or neighbors? More than three times a week     How often do you get together with friends or relatives? More than three times a week     How often do you attend Religious or Christianity services? More than 4 times per year     Do you belong to any clubs or organizations such as Religious groups, unions, fraternal or athletic groups, or school groups? Yes     How often do you attend meetings of the clubs or organizations you belong to? More than 4 times per year     Are you , , , , never , or living with a partner? Patient refused        Alcohol Use    Q1: How often do you have a drink containing alcohol? Patient refused     Q2: How many drinks containing alcohol do you have on a typical day when you are drinking? Patient refused     Q3: How often do you have six or more drinks on one occasion? Patient refused                      Spoke to pt. Pt lives at home with mother. Post hospital  stay [phone] will be pt support person and pt. has transportation at d/c with daughter. There have been no hospitalizations within the last 30 days per pt. Verified pt PCP and preferred pharmacy. Pt stated not on Coumadin and is not receiving dialysis. All questions answered regarding case management/ discharge planning , pt verbalized understanding. Discharge booklet with SW contact information given to pt.     Sherron Dash LCSW  Case Management/[facility]  [phone]

## 2023-03-28 NOTE — NURSING
Received pt. At 2130. Pt 97.1 Vital signs 165/80. Pt easily awaken. Pt 2 L Chest tubes 1&2 to water seal. Both tubes paten. Dressing gauze saturated in dried blood. Patient is unable to to take meds by mouth because she is too sleepy.

## 2023-03-28 NOTE — PROGRESS NOTES
Marino Soni - [facility]  Thoracic Surgery  Progress Note    Subjective:     History of Present Illness:  No notes on file    Post-Op Info:  Procedure(s) (LRB):  XI ROBOTIC RATS,WITH LOBECTOMY,LUNG (Right)  LYMPHADENECTOMY (Right)  BLOCK, NERVE, INTERCOSTAL, 2 OR MORE (Right)   1 Day Post-Op     Interval History: No acute events. CT to water seal without issues. Pain controlled on current regimen. Has not yet ambulated. Chopra remains in place, UOP improved following OR.     Medications:  Continuous Infusions:  Scheduled Meds:   acetaminophen  1,000 mg Oral Q8H    busPIRone  10 mg Oral TID    enoxaparin  40 mg Subcutaneous Daily    methocarbamoL  500 mg Oral QID    mupirocin  1 g Nasal BID    pregabalin  300 mg Oral TID     PRN Meds:metoclopramide HCl, ondansetron, oxyCODONE, oxyCODONE     Review of patient's allergies indicates:  No Known Allergies  Objective:     Vital Signs (Most Recent):  Temp: 97.9 °F (36.6 °C) (03/28/23 0450)  Pulse: 100 (03/28/23 0450)  Resp: 18 (03/28/23 0450)  BP: (!) 159/91 (03/28/23 0450)  SpO2: 95 % (03/28/23 0450)   Vital Signs (24h Range):  Temp:  [97 °F (36.1 °C)-97.9 °F (36.6 °C)] 97.9 °F (36.6 °C)  Pulse:  [] 100  Resp:  [12-25] 18  SpO2:  [91 %-100 %] 95 %  BP: (118-167)/(64-91) 159/91     Intake/Output - Last 3 Shifts         03/26 0700  03/27 0659 03/27 0700  03/28 0659 03/28 0700  03/29 0659    IV Piggyback  1300     Total Intake(mL/kg)  1300 (18.1)     Urine (mL/kg/hr)  315     Stool  0     Blood  100     Chest Tube  95     Total Output  510     Net  +790            Stool Occurrence  0 x             SpO2: 95 %       Physical Exam  Vitals reviewed.   Constitutional:       Appearance: Normal appearance.   HENT:      Head: Normocephalic and atraumatic.   Eyes:      Extraocular Movements: Extraocular movements intact.      Conjunctiva/sclera: Conjunctivae normal.   Cardiovascular:      Rate and Rhythm: Normal rate.      Pulses: Normal pulses.   Pulmonary:      Effort: Pulmonary  effort is normal. No respiratory distress.      Comments: CT x2 in place. Serosanguinous output in both atria. Both tubes on water seal without air leak  Abdominal:      General: Abdomen is flat. There is no distension.      Palpations: Abdomen is soft.      Tenderness: There is no abdominal tenderness.   Musculoskeletal:         General: No swelling. Normal range of motion.      Cervical back: Normal range of motion and neck supple.   Neurological:      General: No focal deficit present.      Mental Status: She is alert and oriented to person, place, and time.       Significant Labs:  CBC:   Recent Labs   Lab 03/27/23  1810   WBC 8.36   RBC 4.25   HGB 13.2   HCT 39.8      MCV 94   MCH 31.1*   MCHC 33.2     CMP:   Recent Labs   Lab 03/27/23  1810   CREATININE 1.3       Significant Diagnostics:  I have reviewed all pertinent imaging results/findings within the past 24 hours.    VTE Risk Mitigation (From admission, onward)           Ordered     enoxaparin injection 40 mg  Daily         03/27/23 1742     IP VTE HIGH RISK PATIENT  Once         03/27/23 1742     Place sequential compression device  Until discontinued         03/27/23 1742     Place MARISA hose  Until discontinued         03/27/23 1018     Place sequential compression device  Until discontinued         03/27/23 1018                  Assessment/Plan:     * Squamous cell carcinoma lung, right  67F with history of COPD, lupus anti-coagulant disorder, NSCLC of the right lung who is s/p robotic right lower lobectomy on 3/27/23.    - Regular diet  - Will remove regular CT today. Keep other remaining CT to water seal  - PRN pain control, multi modal  - Daily CXR  - One time dose of lasix 20 this AM will reassess this PM for additional diuresis.  - D/C lugo  - Home meds reviewed and reconciled as appropriate   - Lovenox, SCDs  - Needs to be up and ambulating today         Valorie Ayon MD  Thoracic Surgery  [facility]

## 2023-03-28 NOTE — SUBJECTIVE & OBJECTIVE
Interval History: No acute events. CT to water seal without issues. Pain controlled on current regimen. Has not yet ambulated. Chopra remains in place, UOP improved following OR.     Medications:  Continuous Infusions:  Scheduled Meds:   acetaminophen  1,000 mg Oral Q8H    busPIRone  10 mg Oral TID    enoxaparin  40 mg Subcutaneous Daily    methocarbamoL  500 mg Oral QID    mupirocin  1 g Nasal BID    pregabalin  300 mg Oral TID     PRN Meds:metoclopramide HCl, ondansetron, oxyCODONE, oxyCODONE     Review of patient's allergies indicates:  No Known Allergies  Objective:     Vital Signs (Most Recent):  Temp: 97.9 °F (36.6 °C) (03/28/23 0450)  Pulse: 100 (03/28/23 0450)  Resp: 18 (03/28/23 0450)  BP: (!) 159/91 (03/28/23 0450)  SpO2: 95 % (03/28/23 0450)   Vital Signs (24h Range):  Temp:  [97 °F (36.1 °C)-97.9 °F (36.6 °C)] 97.9 °F (36.6 °C)  Pulse:  [] 100  Resp:  [12-25] 18  SpO2:  [91 %-100 %] 95 %  BP: (118-167)/(64-91) 159/91     Intake/Output - Last 3 Shifts         03/26 0700  03/27 0659 03/27 0700  03/28 0659 03/28 0700  03/29 0659    IV Piggyback  1300     Total Intake(mL/kg)  1300 (18.1)     Urine (mL/kg/hr)  315     Stool  0     Blood  100     Chest Tube  95     Total Output  510     Net  +790            Stool Occurrence  0 x             SpO2: 95 %       Physical Exam  Vitals reviewed.   Constitutional:       Appearance: Normal appearance.   HENT:      Head: Normocephalic and atraumatic.   Eyes:      Extraocular Movements: Extraocular movements intact.      Conjunctiva/sclera: Conjunctivae normal.   Cardiovascular:      Rate and Rhythm: Normal rate.      Pulses: Normal pulses.   Pulmonary:      Effort: Pulmonary effort is normal. No respiratory distress.      Comments: CT x2 in place. Serosanguinous output in both atria. Both tubes on water seal without air leak  Abdominal:      General: Abdomen is flat. There is no distension.      Palpations: Abdomen is soft.      Tenderness: There is no abdominal  tenderness.   Musculoskeletal:         General: No swelling. Normal range of motion.      Cervical back: Normal range of motion and neck supple.   Neurological:      General: No focal deficit present.      Mental Status: She is alert and oriented to person, place, and time.       Significant Labs:  CBC:   Recent Labs   Lab 03/27/23  1810   WBC 8.36   RBC 4.25   HGB 13.2   HCT 39.8      MCV 94   MCH 31.1*   MCHC 33.2     CMP:   Recent Labs   Lab 03/27/23  1810   CREATININE 1.3       Significant Diagnostics:  I have reviewed all pertinent imaging results/findings within the past 24 hours.    VTE Risk Mitigation (From admission, onward)           Ordered     enoxaparin injection 40 mg  Daily         03/27/23 1742     IP VTE HIGH RISK PATIENT  Once         03/27/23 1742     Place sequential compression device  Until discontinued         03/27/23 1742     Place MARISA hose  Until discontinued         03/27/23 1018     Place sequential compression device  Until discontinued         03/27/23 1018

## 2023-03-29 PROCEDURE — 63600175 PHARM REV CODE 636 W HCPCS: Performed by: STUDENT IN AN ORGANIZED HEALTH CARE EDUCATION/TRAINING PROGRAM

## 2023-03-29 PROCEDURE — 25000003 PHARM REV CODE 250: Performed by: STUDENT IN AN ORGANIZED HEALTH CARE EDUCATION/TRAINING PROGRAM

## 2023-03-29 PROCEDURE — 20600001 HC STEP DOWN PRIVATE ROOM

## 2023-03-29 RX ADMIN — PREGABALIN 300 MG: 150 CAPSULE ORAL at 08:03

## 2023-03-29 RX ADMIN — ACETAMINOPHEN 1000 MG: 500 TABLET ORAL at 01:03

## 2023-03-29 RX ADMIN — METHOCARBAMOL 500 MG: 500 TABLET ORAL at 01:03

## 2023-03-29 RX ADMIN — BUSPIRONE HYDROCHLORIDE 10 MG: 10 TABLET ORAL at 02:03

## 2023-03-29 RX ADMIN — ACETAMINOPHEN 1000 MG: 500 TABLET ORAL at 09:03

## 2023-03-29 RX ADMIN — METHOCARBAMOL 500 MG: 500 TABLET ORAL at 05:03

## 2023-03-29 RX ADMIN — OXYCODONE HYDROCHLORIDE 10 MG: 10 TABLET ORAL at 09:03

## 2023-03-29 RX ADMIN — MUPIROCIN 1 G: 20 OINTMENT TOPICAL at 09:03

## 2023-03-29 RX ADMIN — BUSPIRONE HYDROCHLORIDE 10 MG: 10 TABLET ORAL at 08:03

## 2023-03-29 RX ADMIN — OXYCODONE 5 MG: 5 TABLET ORAL at 01:03

## 2023-03-29 RX ADMIN — ACETAMINOPHEN 1000 MG: 500 TABLET ORAL at 05:03

## 2023-03-29 RX ADMIN — ENOXAPARIN SODIUM 40 MG: 40 INJECTION SUBCUTANEOUS at 05:03

## 2023-03-29 RX ADMIN — METHOCARBAMOL 500 MG: 500 TABLET ORAL at 09:03

## 2023-03-29 RX ADMIN — PREGABALIN 300 MG: 150 CAPSULE ORAL at 02:03

## 2023-03-29 RX ADMIN — HYDROMORPHONE HYDROCHLORIDE 0.5 MG: 1 INJECTION, SOLUTION INTRAMUSCULAR; INTRAVENOUS; SUBCUTANEOUS at 12:03

## 2023-03-29 RX ADMIN — PREGABALIN 300 MG: 150 CAPSULE ORAL at 09:03

## 2023-03-29 RX ADMIN — BUSPIRONE HYDROCHLORIDE 10 MG: 10 TABLET ORAL at 09:03

## 2023-03-29 RX ADMIN — METHOCARBAMOL 500 MG: 500 TABLET ORAL at 08:03

## 2023-03-29 RX ADMIN — OXYCODONE HYDROCHLORIDE 10 MG: 10 TABLET ORAL at 05:03

## 2023-03-29 NOTE — SUBJECTIVE & OBJECTIVE
Interval History: NAEON. On supplemental oxygen overnight. Chest tube without air leak. Pain controlled. Voided following lugo removal. Chest tube clamped on rounds     Medications:  Continuous Infusions:  Scheduled Meds:   acetaminophen  1,000 mg Oral Q8H    busPIRone  10 mg Oral TID    enoxaparin  40 mg Subcutaneous Daily    methocarbamoL  500 mg Oral QID    mupirocin  1 g Nasal BID    pregabalin  300 mg Oral TID     PRN Meds:HYDROmorphone, levalbuterol, metoclopramide HCl, ondansetron, oxyCODONE, oxyCODONE     Review of patient's allergies indicates:  No Known Allergies  Objective:     Vital Signs (Most Recent):  Temp: 96.2 °F (35.7 °C) (03/29/23 0731)  Pulse: 109 (03/29/23 0731)  Resp: 18 (03/29/23 0731)  BP: 113/72 (03/29/23 0731)  SpO2: (!) 94 % (03/29/23 0731)   Vital Signs (24h Range):  Temp:  [96.2 °F (35.7 °C)-99.1 °F (37.3 °C)] 96.2 °F (35.7 °C)  Pulse:  [] 109  Resp:  [16-24] 18  SpO2:  [94 %-97 %] 94 %  BP: (113-149)/(69-87) 113/72     Intake/Output - Last 3 Shifts         03/27 0700  03/28 0659 03/28 0700  03/29 0659 03/29 0700  03/30 0659    IV Piggyback 1300      Total Intake(mL/kg) 1300 (18.1)      Urine (mL/kg/hr) 315 1600 (0.9)     Stool 0      Blood 100      Chest Tube 95 315     Total Output 510 1915     Net +790 -1915            Urine Occurrence  2 x     Stool Occurrence 0 x              SpO2: (!) 94 %       Physical Exam  Vitals reviewed.   Constitutional:       Appearance: Normal appearance.   HENT:      Head: Normocephalic and atraumatic.   Eyes:      Extraocular Movements: Extraocular movements intact.      Conjunctiva/sclera: Conjunctivae normal.   Cardiovascular:      Rate and Rhythm: Normal rate.      Pulses: Normal pulses.   Pulmonary:      Effort: Pulmonary effort is normal. No respiratory distress.      Comments: Dhruv in place without air leak. Ss output.   Abdominal:      General: Abdomen is flat. There is no distension.      Palpations: Abdomen is soft.      Tenderness:  There is no abdominal tenderness.   Musculoskeletal:         General: No swelling. Normal range of motion.      Cervical back: Normal range of motion and neck supple.   Neurological:      General: No focal deficit present.      Mental Status: She is alert and oriented to person, place, and time.       Significant Labs:  BMP:   Recent Labs   Lab 03/28/23  0940   *      K 4.5   *   CO2 19*   BUN 15   CREATININE 1.2   CALCIUM 8.0*     CBC:   Recent Labs   Lab 03/27/23  1810   WBC 8.36   RBC 4.25   HGB 13.2   HCT 39.8      MCV 94   MCH 31.1*   MCHC 33.2       Significant Diagnostics:  CXR: I have reviewed all pertinent results/findings within the past 24 hours    VTE Risk Mitigation (From admission, onward)           Ordered     enoxaparin injection 40 mg  Daily         03/27/23 1742     IP VTE HIGH RISK PATIENT  Once         03/27/23 1742     Place sequential compression device  Until discontinued         03/27/23 1742     Place MARISA hose  Until discontinued         03/27/23 1018     Place sequential compression device  Until discontinued         03/27/23 1018

## 2023-03-29 NOTE — PLAN OF CARE
Marino Soni - [facility]  Discharge Reassessment    Primary Care Provider: Jazmine Chow MD    Expected Discharge Date: 3/30/2023    SW met with pt to discuss discharge plan and assess needs.  Pt has rollator and wheelchair at home.  Pt reported her daughter provides good family support.  Pt has transportation home.      No needs identified at this time.  Pt not medically ready for d/c.  SW will continue to follow and assist with needs.     Reassessment (most recent)       Discharge Reassessment - 03/29/23 0697          Discharge Reassessment    Assessment Type Discharge Planning Reassessment     Did the patient's condition or plan change since previous assessment? No     Discharge Plan discussed with: Patient     Communicated FRANSISCO with patient/caregiver Yes     Discharge Plan A Home;Home with family     Discharge Plan B Home     DME Needed Upon Discharge  none     Discharge Barriers Identified None     Why the patient remains in the hospital Requires continued medical care        Post-Acute Status    Post-Acute Authorization Other     Coverage Medicare A & B     Other Status No Post-Acute Service Needs     Discharge Delays None known at this time                   Aline Allison LMSW  PRN-  [facility]  Ext. [phone]

## 2023-03-29 NOTE — ASSESSMENT & PLAN NOTE
67F with history of COPD, lupus anti-coagulant disorder, NSCLC of the right lung who is s/p robotic right lower lobectomy on 3/27/23.    - Regular diet  - clamp chest tube this morning. Repeat CXR midday.   - PRN pain control, multi modal  No oxygen for spO2>88%.   - Daily CXR  - Home meds reviewed and reconciled as appropriate   - Lovenox, SCDs  - Needs to be up and ambulating today

## 2023-03-29 NOTE — BRIEF OP NOTE
Certification of Assistant at Surgery       Surgery Date: 3/27/2023     Participating Surgeons:  Surgeon(s) and Role:     * El Arevalo MD - Primary      Selin Che PA-C - assisting      * Mackenzie Steele MD - Fellow    Procedures:  Procedure(s) (LRB):  XI ROBOTIC RATS,WITH LOBECTOMY,LUNG (Right)  LYMPHADENECTOMY (Right)  BLOCK, NERVE, INTERCOSTAL, 2 OR MORE (Right)    Assistant Surgeon's Certification of Necessity:  I understand that section 1842 (b) (6) (d) of the Social Security Act generally prohibits Medicare Part B reasonable charge payment for the services of assistants at surgery in teaching hospitals when qualified residents are available to furnish such services. I certify that the services for which payment is claimed were medically necessary, and that no qualified resident was available to perform the services. I further understand that these services are subject to post-payment review by the Medicare carrier.      Selin Che PA-C    03/29/2023  4:50 PM

## 2023-03-29 NOTE — HPI
Patient is a 67 y.o. female smoker with obesity, COPD, chronic pain syndrome, lumbosacral radiculopathy, lupus anticoagulant disorder, FM, and RLS who presents to clinic today for evaluation of RLL Squamous Cell Carcinoma. Patient referred to us by Dr. Ring following workup for severe cough which included LDCT obtained following appointment on 01/10/23. LDCT 23 revealed RLL lung mass (previous LDCT screening 2020 no evidence of malignancy). PET/CT 23 revealed hypermetabolic RLL lung mass. Patient subsequently underwent transbronchial biopsy 02/10/23; path: histological and immunohistochemical findings consisted with squamous cell carcinoma. Today patient reports SINGLETON which she attributes to her current weight and ago. Also reports cough 2/2 to post-nasal drip. Denies fever, chills, diaphoresis, unintentional weight loss, syncope, dizziness, CP.       PSH: spinal cord stimulation x2 (most recent 2022), Radiofrequency ablation of nerves innervating SI joint, cholecystectomy, , tonsillectomy, colonoscopy, Lumbar fusion (titanium hardware, laminectomy  Meds: cyclobenzaprine, tizanidine, lyrica, topiramate, denies A/C

## 2023-03-29 NOTE — PROGRESS NOTES
[facility] Soni - [facility]  Thoracic Surgery  Progress Note    Subjective:     History of Present Illness:  Patient is a 67 y.o. female smoker with obesity, COPD, chronic pain syndrome, lumbosacral radiculopathy, lupus anticoagulant disorder, FM, and RLS who presents to clinic today for evaluation of RLL Squamous Cell Carcinoma. Patient referred to us by Dr. Ring following workup for severe cough which included LDCT obtained following appointment on 01/10/23. LDCT 23 revealed RLL lung mass (previous LDCT screening 2020 no evidence of malignancy). PET/CT 23 revealed hypermetabolic RLL lung mass. Patient subsequently underwent transbronchial biopsy 02/10/23; path: histological and immunohistochemical findings consisted with squamous cell carcinoma. Today patient reports SINGLETON which she attributes to her current weight and ago. Also reports cough 2/2 to post-nasal drip. Denies fever, chills, diaphoresis, unintentional weight loss, syncope, dizziness, CP.       PSH: spinal cord stimulation x2 (most recent 2022), Radiofrequency ablation of nerves innervating SI joint, cholecystectomy, , tonsillectomy, colonoscopy, Lumbar fusion (titanium hardware, laminectomy  Meds: cyclobenzaprine, tizanidine, lyrica, topiramate, denies A/C      Post-Op Info:  Procedure(s) (LRB):  XI ROBOTIC RATS,WITH LOBECTOMY,LUNG (Right)  LYMPHADENECTOMY (Right)  BLOCK, NERVE, INTERCOSTAL, 2 OR MORE (Right)   2 Days Post-Op     Interval History: NAEON. On supplemental oxygen overnight. Chest tube without air leak. Pain controlled. Voided following lugo removal. Chest tube clamped on rounds     Medications:  Continuous Infusions:  Scheduled Meds:   acetaminophen  1,000 mg Oral Q8H    busPIRone  10 mg Oral TID    enoxaparin  40 mg Subcutaneous Daily    methocarbamoL  500 mg Oral QID    mupirocin  1 g Nasal BID    pregabalin  300 mg Oral TID     PRN Meds:HYDROmorphone, levalbuterol, metoclopramide HCl, ondansetron, oxyCODONE,  oxyCODONE     Review of patient's allergies indicates:  No Known Allergies  Objective:     Vital Signs (Most Recent):  Temp: 96.2 °F (35.7 °C) (03/29/23 0731)  Pulse: 109 (03/29/23 0731)  Resp: 18 (03/29/23 0731)  BP: 113/72 (03/29/23 0731)  SpO2: (!) 94 % (03/29/23 0731)   Vital Signs (24h Range):  Temp:  [96.2 °F (35.7 °C)-99.1 °F (37.3 °C)] 96.2 °F (35.7 °C)  Pulse:  [] 109  Resp:  [16-24] 18  SpO2:  [94 %-97 %] 94 %  BP: (113-149)/(69-87) 113/72     Intake/Output - Last 3 Shifts         03/27 0700  03/28 0659 03/28 0700  03/29 0659 03/29 0700 03/30 0659    IV Piggyback 1300      Total Intake(mL/kg) 1300 (18.1)      Urine (mL/kg/hr) 315 1600 (0.9)     Stool 0      Blood 100      Chest Tube 95 315     Total Output 510 1915     Net +790 -1915            Urine Occurrence  2 x     Stool Occurrence 0 x              SpO2: (!) 94 %       Physical Exam  Vitals reviewed.   Constitutional:       Appearance: Normal appearance.   HENT:      Head: Normocephalic and atraumatic.   Eyes:      Extraocular Movements: Extraocular movements intact.      Conjunctiva/sclera: Conjunctivae normal.   Cardiovascular:      Rate and Rhythm: Normal rate.      Pulses: Normal pulses.   Pulmonary:      Effort: Pulmonary effort is normal. No respiratory distress.      Comments: Dhruv in place without air leak. Ss output.   Abdominal:      General: Abdomen is flat. There is no distension.      Palpations: Abdomen is soft.      Tenderness: There is no abdominal tenderness.   Musculoskeletal:         General: No swelling. Normal range of motion.      Cervical back: Normal range of motion and neck supple.   Neurological:      General: No focal deficit present.      Mental Status: She is alert and oriented to person, place, and time.       Significant Labs:  BMP:   Recent Labs   Lab 03/28/23  0940   *      K 4.5   *   CO2 19*   BUN 15   CREATININE 1.2   CALCIUM 8.0*     CBC:   Recent Labs   Lab 03/27/23  1810   WBC 8.36    RBC 4.25   HGB 13.2   HCT 39.8      MCV 94   MCH 31.1*   MCHC 33.2       Significant Diagnostics:  CXR: I have reviewed all pertinent results/findings within the past 24 hours    VTE Risk Mitigation (From admission, onward)           Ordered     enoxaparin injection 40 mg  Daily         03/27/23 1742     IP VTE HIGH RISK PATIENT  Once         03/27/23 1742     Place sequential compression device  Until discontinued         03/27/23 1742     Place MARISA hose  Until discontinued         03/27/23 1018     Place sequential compression device  Until discontinued         03/27/23 1018                  Assessment/Plan:     * Squamous cell carcinoma lung, right  67F with history of COPD, lupus anti-coagulant disorder, NSCLC of the right lung who is s/p robotic right lower lobectomy on 3/27/23.    - Regular diet  - clamp chest tube this morning. Repeat CXR midday.   - PRN pain control, multi modal  No oxygen for spO2>88%.   - Daily CXR  - Home meds reviewed and reconciled as appropriate   - Lovenox, SCDs  - Needs to be up and ambulating today         Selin Che PA-C  Thoracic Surgery  [facility]

## 2023-03-30 VITALS
BODY MASS INDEX: 29.62 KG/M2 | DIASTOLIC BLOOD PRESSURE: 59 MMHG | OXYGEN SATURATION: 93 % | HEIGHT: 62 IN | SYSTOLIC BLOOD PRESSURE: 149 MMHG | TEMPERATURE: 99 F | HEART RATE: 59 BPM | WEIGHT: 160.94 LBS | RESPIRATION RATE: 20 BRPM

## 2023-03-30 DIAGNOSIS — C34.31 MALIGNANT NEOPLASM OF LOWER LOBE OF RIGHT LUNG: Primary | ICD-10-CM

## 2023-03-30 PROCEDURE — 25000003 PHARM REV CODE 250: Performed by: STUDENT IN AN ORGANIZED HEALTH CARE EDUCATION/TRAINING PROGRAM

## 2023-03-30 RX ORDER — OXYCODONE HYDROCHLORIDE 5 MG/1
5 TABLET ORAL EVERY 4 HOURS PRN
Qty: 41 TABLET | Refills: 0 | Status: SHIPPED | OUTPATIENT
Start: 2023-03-30 | End: 2023-04-14 | Stop reason: SDUPTHER

## 2023-03-30 RX ADMIN — MUPIROCIN 1 G: 20 OINTMENT TOPICAL at 09:03

## 2023-03-30 RX ADMIN — OXYCODONE HYDROCHLORIDE 10 MG: 10 TABLET ORAL at 07:03

## 2023-03-30 RX ADMIN — PREGABALIN 300 MG: 150 CAPSULE ORAL at 09:03

## 2023-03-30 RX ADMIN — BUSPIRONE HYDROCHLORIDE 10 MG: 10 TABLET ORAL at 09:03

## 2023-03-30 RX ADMIN — METHOCARBAMOL 500 MG: 500 TABLET ORAL at 09:03

## 2023-03-30 RX ADMIN — ACETAMINOPHEN 1000 MG: 500 TABLET ORAL at 05:03

## 2023-03-30 NOTE — NURSING
AVS/discharge instructions printed and reviewed with patient. Pt verbalizes understanding of discharge instructions and when to contact HCP for emergency situations. Pt verbalizes understanding of ambulation promotion. Pt verbalizes understanding of medication adherence. Pt awaiting on bedside delivery of prescription and daughter to arrive for ride home. Pt's vitals wnl, pain controlled.

## 2023-03-30 NOTE — PLAN OF CARE
CHW met with patient/family at bedside. Patient experience rounding completed and reviewed the following.     Do you know your discharge plan? Yes or No,    If yes, what is the plan? (Home, Home Health, Rehab, SNF, LTAC, or Other)   Home/ family    If you are discharging home, do you have help at home? Yes or No     Yes    Do you think you will need help at home at discharge? Yes or No    No    Have you discussed your needs and preferences with your SW/CM? Yes or No    Yes    Assigned SW/CM notified of any patient/family needs or concerns.     Aline Cisse CHW  Case Management   [phone]

## 2023-03-30 NOTE — ASSESSMENT & PLAN NOTE
67F with history of COPD, lupus anti-coagulant disorder, NSCLC of the right lung who is s/p robotic right lower lobectomy on 3/27/23.    - Plan to discharge this AM  - Regular diet  - PRN pain control, multi modal  No oxygen for spO2>88%.   - Daily CXR  - Home meds reviewed and reconciled as appropriate   - Lovenox, SCDs  - OOB, ambulate as tolerated

## 2023-03-30 NOTE — PLAN OF CARE
Marino Soni - [facility]  Discharge Final Note    Primary Care Provider: Jazmine Chow MD    Expected Discharge Date: 3/30/2023    Home no needs identified.  Pt has transportation home.     Final Discharge Note (most recent)       Final Note - 03/30/23 1406          Final Note    Assessment Type Final Discharge Note     Anticipated Discharge Disposition Home or Self Care     Hospital Resources/Appts/Education Provided Appointments scheduled and added to AVS        Post-Acute Status    Post-Acute Authorization Other     Coverage Medicare A & B     Other Status No Post-Acute Service Needs     Discharge Delays None known at this time                     Important Message from Medicare  Important Message from Medicare regarding Discharge Appeal Rights: Given to patient/caregiver, Explained to patient/caregiver, Signed/date by patient/caregiver     Date IMM was signed: 03/30/23  Time IMM was signed: 1108    Contact Info       El Arevalo MD   Specialty: Cardiothoracic Surgery    [address]  [facility] [address]   Phone: [phone]       Next Steps: Follow up in 2 week(s)          Future Appointments   Date Time Provider Department Center   4/12/2023  9:00 AM [facility]   4/12/2023 10:15 AM El Arevalo MD [facility] Kyrie Allison LMSW  PRN-  [facility]  Ext. [phone]

## 2023-03-30 NOTE — PROGRESS NOTES
Marino Soni - [facility]  Thoracic Surgery  Progress Note    Subjective:     History of Present Illness:  Patient is a 67 y.o. female smoker with obesity, COPD, chronic pain syndrome, lumbosacral radiculopathy, lupus anticoagulant disorder, FM, and RLS who presents to clinic today for evaluation of RLL Squamous Cell Carcinoma. Patient referred to us by Dr. Ring following workup for severe cough which included LDCT obtained following appointment on 01/10/23. LDCT 23 revealed RLL lung mass (previous LDCT screening 2020 no evidence of malignancy). PET/CT 23 revealed hypermetabolic RLL lung mass. Patient subsequently underwent transbronchial biopsy 02/10/23; path: histological and immunohistochemical findings consisted with squamous cell carcinoma. Today patient reports SINGLETON which she attributes to her current weight and ago. Also reports cough 2/2 to post-nasal drip. Denies fever, chills, diaphoresis, unintentional weight loss, syncope, dizziness, CP.       PSH: spinal cord stimulation x2 (most recent 2022), Radiofrequency ablation of nerves innervating SI joint, cholecystectomy, , tonsillectomy, colonoscopy, Lumbar fusion (titanium hardware, laminectomy  Meds: cyclobenzaprine, tizanidine, lyrica, topiramate, denies A/C      Post-Op Info:  Procedure(s) (LRB):  XI ROBOTIC RATS,WITH LOBECTOMY,LUNG (Right)  LYMPHADENECTOMY (Right)  BLOCK, NERVE, INTERCOSTAL, 2 OR MORE (Right)   3 Days Post-Op     Interval History: NAEON. Pain controlled. Voiding. O2: 93% on RA.     Medications:  Continuous Infusions:  Scheduled Meds:   acetaminophen  1,000 mg Oral Q8H    busPIRone  10 mg Oral TID    enoxaparin  40 mg Subcutaneous Daily    methocarbamoL  500 mg Oral QID    mupirocin  1 g Nasal BID    pregabalin  300 mg Oral TID     PRN Meds:HYDROmorphone, levalbuterol, metoclopramide HCl, ondansetron, oxyCODONE, oxyCODONE     Review of patient's allergies indicates:  No Known Allergies  Objective:     Vital Signs  (Most Recent):  Temp: 98.5 °F (36.9 °C) (03/30/23 0713)  Pulse: (!) 59 (03/30/23 0713)  Resp: 20 (03/30/23 0715)  BP: (!) 149/59 (03/30/23 0713)  SpO2: (!) 93 % (03/30/23 0713)   Vital Signs (24h Range):  Temp:  [96.9 °F (36.1 °C)-98.7 °F (37.1 °C)] 98.5 °F (36.9 °C)  Pulse:  [] 59  Resp:  [18-20] 20  SpO2:  [91 %-95 %] 93 %  BP: (115-149)/(59-78) 149/59     Intake/Output - Last 3 Shifts         03/28 0700  03/29 0659 03/29 0700  03/30 0659 03/30 0700 03/31 0659    IV Piggyback       Total Intake(mL/kg)       Urine (mL/kg/hr) 1600 (0.9)      Stool       Blood       Chest Tube 315      Total Output 1915      Net -1915             Urine Occurrence 2 x 3 x             SpO2: (!) 93 %       Physical Exam  Vitals reviewed.   Constitutional:       Appearance: Normal appearance.   HENT:      Head: Normocephalic and atraumatic.   Eyes:      Extraocular Movements: Extraocular movements intact.      Conjunctiva/sclera: Conjunctivae normal.   Cardiovascular:      Rate and Rhythm: Normal rate.      Pulses: Normal pulses.   Pulmonary:      Effort: Pulmonary effort is normal. No respiratory distress.   Abdominal:      General: Abdomen is flat. There is no distension.      Palpations: Abdomen is soft.      Tenderness: There is no abdominal tenderness.   Musculoskeletal:         General: No swelling. Normal range of motion.      Cervical back: Normal range of motion and neck supple.   Neurological:      General: No focal deficit present.      Mental Status: She is alert and oriented to person, place, and time.       Significant Labs:  All pertinent labs from the last 24 hours have been reviewed.    Significant Diagnostics:  CXR: I have reviewed all pertinent results/findings within the past 24 hours    VTE Risk Mitigation (From admission, onward)           Ordered     enoxaparin injection 40 mg  Daily         03/27/23 1742     IP VTE HIGH RISK PATIENT  Once         03/27/23 1742     Place sequential compression device  Until  discontinued         03/27/23 0402                  Assessment/Plan:     * Squamous cell carcinoma lung, right  67F with history of COPD, lupus anti-coagulant disorder, NSCLC of the right lung who is s/p robotic right lower lobectomy on 3/27/23.    - Plan to discharge this AM  - Regular diet  - PRN pain control, multi modal  No oxygen for spO2>88%.   - Daily CXR  - Home meds reviewed and reconciled as appropriate   - Lovenox, SCDs  - OOB, ambulate as tolerated        Michael Garcia PA-C  Thoracic Surgery  [facility]

## 2023-03-30 NOTE — SUBJECTIVE & OBJECTIVE
Interval History: NAEON. Pain controlled. Voiding. O2: 93% on RA.     Medications:  Continuous Infusions:  Scheduled Meds:   acetaminophen  1,000 mg Oral Q8H    busPIRone  10 mg Oral TID    enoxaparin  40 mg Subcutaneous Daily    methocarbamoL  500 mg Oral QID    mupirocin  1 g Nasal BID    pregabalin  300 mg Oral TID     PRN Meds:HYDROmorphone, levalbuterol, metoclopramide HCl, ondansetron, oxyCODONE, oxyCODONE     Review of patient's allergies indicates:  No Known Allergies  Objective:     Vital Signs (Most Recent):  Temp: 98.5 °F (36.9 °C) (03/30/23 0713)  Pulse: (!) 59 (03/30/23 0713)  Resp: 20 (03/30/23 0715)  BP: (!) 149/59 (03/30/23 0713)  SpO2: (!) 93 % (03/30/23 0713)   Vital Signs (24h Range):  Temp:  [96.9 °F (36.1 °C)-98.7 °F (37.1 °C)] 98.5 °F (36.9 °C)  Pulse:  [] 59  Resp:  [18-20] 20  SpO2:  [91 %-95 %] 93 %  BP: (115-149)/(59-78) 149/59     Intake/Output - Last 3 Shifts         03/28 0700  03/29 0659 03/29 0700  03/30 0659 03/30 0700 03/31 0659    IV Piggyback       Total Intake(mL/kg)       Urine (mL/kg/hr) 1600 (0.9)      Stool       Blood       Chest Tube 315      Total Output 1915      Net -1915             Urine Occurrence 2 x 3 x             SpO2: (!) 93 %       Physical Exam  Vitals reviewed.   Constitutional:       Appearance: Normal appearance.   HENT:      Head: Normocephalic and atraumatic.   Eyes:      Extraocular Movements: Extraocular movements intact.      Conjunctiva/sclera: Conjunctivae normal.   Cardiovascular:      Rate and Rhythm: Normal rate.      Pulses: Normal pulses.   Pulmonary:      Effort: Pulmonary effort is normal. No respiratory distress.   Abdominal:      General: Abdomen is flat. There is no distension.      Palpations: Abdomen is soft.      Tenderness: There is no abdominal tenderness.   Musculoskeletal:         General: No swelling. Normal range of motion.      Cervical back: Normal range of motion and neck supple.   Neurological:      General: No focal  deficit present.      Mental Status: She is alert and oriented to person, place, and time.       Significant Labs:  All pertinent labs from the last 24 hours have been reviewed.    Significant Diagnostics:  CXR: I have reviewed all pertinent results/findings within the past 24 hours    VTE Risk Mitigation (From admission, onward)           Ordered     enoxaparin injection 40 mg  Daily         03/27/23 1742     IP VTE HIGH RISK PATIENT  Once         03/27/23 1742     Place sequential compression device  Until discontinued         03/27/23 1742

## 2023-03-31 NOTE — DISCHARGE SUMMARY
[facility]  General Surgery  Discharge Summary      Patient Name: Cyndi Vu  MRN: [id number]  Admission Date: 3/27/2023  Hospital Length of Stay: 3 days  Discharge Date and Time: 3/30/2023 11:43 AM  Attending Physician: No att. providers found   Discharging Provider: Selin Che PA-C  Primary Care Provider: Jazmine Chow MD     HPI:   Patient is a 67 y.o. female smoker with obesity, COPD, chronic pain syndrome, lumbosacral radiculopathy, lupus anticoagulant disorder, FM, and RLS who presents to clinic today for evaluation of RLL Squamous Cell Carcinoma. Patient referred to us by Dr. Ring following workup for severe cough which included LDCT obtained following appointment on 01/10/23. LDCT 23 revealed RLL lung mass (previous LDCT screening 2020 no evidence of malignancy). PET/CT 23 revealed hypermetabolic RLL lung mass. Patient subsequently underwent transbronchial biopsy 02/10/23; path: histological and immunohistochemical findings consisted with squamous cell carcinoma. Today patient reports SINGLETON which she attributes to her current weight and ago. Also reports cough 2/2 to post-nasal drip. Denies fever, chills, diaphoresis, unintentional weight loss, syncope, dizziness, CP.       PSH: spinal cord stimulation x2 (most recent 2022), Radiofrequency ablation of nerves innervating SI joint, cholecystectomy, , tonsillectomy, colonoscopy, Lumbar fusion (titanium hardware, laminectomy  Meds: cyclobenzaprine, tizanidine, lyrica, topiramate, denies A/C    Procedure(s) (LRB):  XI ROBOTIC RATS,WITH LOBECTOMY,LUNG (Right)  LYMPHADENECTOMY (Right)  BLOCK, NERVE, INTERCOSTAL, 2 OR MORE (Right)     Hospital Course: patient admitted following the above mentioned procedure which she tolerated well. Chest tube removed on POD 1. Dhruv clamp trial POD 2 and subsequent removal. Weaned to RA. Pain controlled with home regimen plus oxycodone. Ambulated and voided.     Consults:      Significant Diagnostic Studies: Radiology: X-Ray: CXR: X-Ray Chest 1 View (CXR):   Results for orders placed or performed during the hospital encounter of 03/27/23   X-Ray Chest 1 View    Narrative    EXAMINATION:  XR CHEST 1 VIEW    CLINICAL HISTORY:  chest tube clamped;    TECHNIQUE:  Single frontal view of the chest was performed.    COMPARISON:  March 29, 2023, 0619    FINDINGS:  Image quality degraded by portable technique and patient body habitus.  There is a right hemithorax chest tube with its tip along the medial aspect of the lower chest.  There is a small right apical pneumothorax.  Findings within the lungs are unchanged.      Impression    As above      Electronically signed by: Madhavi Quesada MD  Date:    03/29/2023  Time:    16:06       Pending Diagnostic Studies:       Procedure Component Value Units Date/Time    Specimen to Pathology, Surgery Pulmonary and Thoracic [[id number]] Collected: 03/27/23 1737    Order Status: Sent Lab Status: In process Updated: 03/28/23 1056    Specimen: Tissue           Final Active Diagnoses:    Diagnosis Date Noted POA    PRINCIPAL PROBLEM:  Squamous cell carcinoma lung, right [C34.91] 03/28/2023 Yes      Problems Resolved During this Admission:      Discharged Condition: good    Disposition: Home or Self Care    Follow Up:   Follow-up Information       El Arevalo MD Follow up in 2 week(s).    Specialty: Cardiothoracic Surgery  Contact information:  [facility] [address]  [phone]                           Patient Instructions:      Diet Adult Regular     Remove dressing in 48 hours     Notify your health care provider if you experience any of the following:  increased confusion or weakness     Notify your health care provider if you experience any of the following:  persistent dizziness, light-headedness, or visual disturbances     Notify your health care provider if you experience any of the following:  worsening rash     Notify  your health care provider if you experience any of the following:  severe persistent headache     Notify your health care provider if you experience any of the following:  difficulty breathing or increased cough     Notify your health care provider if you experience any of the following:  redness, tenderness, or signs of infection (pain, swelling, redness, odor or green/yellow discharge around incision site)     Notify your health care provider if you experience any of the following:  severe uncontrolled pain     Notify your health care provider if you experience any of the following:  persistent nausea and vomiting or diarrhea     Notify your health care provider if you experience any of the following:  temperature >100.4     Shower on day dressing removed (No bath)     Medications:  Reconciled Home Medications:      Medication List        START taking these medications      oxyCODONE 5 MG immediate release tablet  Commonly known as: ROXICODONE  Take 1 tablet (5 mg total) by mouth every 4 (four) hours as needed.            CONTINUE taking these medications      busPIRone 10 MG tablet  Commonly known as: BUSPAR  Take 10 mg by mouth 3 (three) times daily.     cyclobenzaprine 10 MG tablet  Commonly known as: FLEXERIL  Take 10 mg by mouth 3 (three) times daily.     pregabalin 300 MG Cap  Commonly known as: LYRICA  Take 300 mg by mouth 3 (three) times daily.     tiZANidine 4 mg Cap  Take 4 mg by mouth 2 (two) times daily.     topiramate 50 MG tablet  Commonly known as: TOPAMAX  Take 50 mg by mouth 2 (two) times daily. PATIENT TAKING ONLY AT NIGHT              Selin Che PA-C  General Surgery  [facility]

## 2023-04-03 ENCOUNTER — TELEPHONE (OUTPATIENT)
Dept: CARDIOTHORACIC SURGERY | Facility: CLINIC | Age: 68
End: 2023-04-03
Payer: MEDICARE

## 2023-04-03 NOTE — TELEPHONE ENCOUNTER
----- Message from Janneth Pillai sent at 4/3/2023  2:13 PM CDT -----  Contact: patient  Type:  Patient Call          Who Called: patient         Does the patient know what this is regarding?: Requesting a call back about having her appt this Friday due to her transportation ; please advise           Would the patient rather a call back or a response via [facility]? call          Best Call Back Number: [phone]             Additional Information:

## 2023-04-03 NOTE — TELEPHONE ENCOUNTER
Called and left patient a message that this Friday is too soon for her post op chest xray and appointment, since she was discharged on Friday. Left message that we can move the appointment to next Friday. Patient to call back with appointment preference.

## 2023-04-06 LAB
FINAL PATHOLOGIC DIAGNOSIS: NORMAL
GROSS: NORMAL
Lab: NORMAL
SUPPLEMENTAL DIAGNOSIS: NORMAL

## 2023-04-10 NOTE — PROGRESS NOTES
"Subjective     Patient ID: Cyndi Vu is a 67 y.o. female.    Chief Complaint: Post-op Evaluation    Diagnosis:  NSCLC - squamous cell    Pre-operative therapy: none     Procedure(s) and date(s): 3/27/23: right robotic assisted lower lobectomy with MLND     Pathology: 3cm invasive squamous cell carcinoma. HA present. LN levels 2,4,7,11, 12 = negative. pT1cN0     Post-operative therapy: surveillance     HPI  Patient is a 67 y.o. female smoker with obesity, COPD, chronic pain syndrome, lumbosacral radiculopathy, lupus anticoagulant disorder, FM, and RLS who presents to clinic today for 2 week follow up s/p right robotic assisted lower lobectomy for RLL Squamous Cell Carcinoma. Post operative course uncomplicated. Doing okay today. Taking care of her 90 year old mother. Some exertional dyspnea. Pain controlled. Weaning narcotics during the day.     Review of Systems   Constitutional:  Negative for activity change and appetite change.   Respiratory:  Negative for cough.    Gastrointestinal:  Negative for abdominal pain.   Musculoskeletal:  Negative for arthralgias.   Neurological:  Negative for syncope, coordination difficulties and coordination difficulties.   Psychiatric/Behavioral:  Negative for agitation. The patient is not nervous/anxious.         Objective   Vitals:    04/12/23 0959   BP: 134/76   Pulse: 104   SpO2: (!) 94%   Weight: 70.9 kg (156 lb 4.9 oz)   Height: 5' 2" (1.575 m)   PainSc: 0-No pain         Physical Exam  Constitutional:       Appearance: Normal appearance.   HENT:      Head: Normocephalic and atraumatic.   Cardiovascular:      Rate and Rhythm: Normal rate and regular rhythm.   Pulmonary:      Effort: Pulmonary effort is normal.      Breath sounds: Normal breath sounds.   Abdominal:      Palpations: Abdomen is soft.   Musculoskeletal:      Cervical back: Normal range of motion and neck supple.      Right lower leg: No edema.      Left lower leg: No edema.   Skin:     General: Skin " is warm and dry.   Neurological:      General: No focal deficit present.      Mental Status: She is alert and oriented to person, place, and time.   Psychiatric:         Mood and Affect: Mood normal.         Behavior: Behavior normal.       Diagnostics:     CXR - 04/12/23:  I reviewed the image   Heart size normal.  Small right apical pneumothorax.  Opacification at the right lung base with blunted costophrenic angle similar to the previous study.  The left lung is clear.    Assessment and Plan     Patient is a 67 y.o. female smoker with obesity, COPD, chronic pain syndrome, lumbosacral radiculopathy, lupus anticoagulant disorder, FM, and RLS who presents to clinic today for 2 week follow up s/p right robotic assisted lower lobectomy for RLL Squamous Cell Carcinoma.     pT1cN0 RLL squamous cell carcinoma  No indication for adjuvant therapy  Recovering. Message with questions. Otherwise, return to clinic in 6 months with chest CT.

## 2023-04-12 ENCOUNTER — OFFICE VISIT (OUTPATIENT)
Dept: CARDIOTHORACIC SURGERY | Facility: CLINIC | Age: 68
End: 2023-04-12
Payer: MEDICARE

## 2023-04-12 ENCOUNTER — HOSPITAL ENCOUNTER (OUTPATIENT)
Dept: RADIOLOGY | Facility: HOSPITAL | Age: 68
Discharge: HOME | End: 2023-04-12
Attending: PHYSICIAN ASSISTANT
Payer: MEDICARE

## 2023-04-12 VITALS
WEIGHT: 156.31 LBS | SYSTOLIC BLOOD PRESSURE: 134 MMHG | HEIGHT: 62 IN | HEART RATE: 104 BPM | OXYGEN SATURATION: 94 % | BODY MASS INDEX: 28.76 KG/M2 | DIASTOLIC BLOOD PRESSURE: 76 MMHG

## 2023-04-12 DIAGNOSIS — C34.31 MALIGNANT NEOPLASM OF LOWER LOBE OF RIGHT LUNG: ICD-10-CM

## 2023-04-12 DIAGNOSIS — C34.31 MALIGNANT NEOPLASM OF LOWER LOBE OF RIGHT LUNG: Primary | ICD-10-CM

## 2023-04-12 PROCEDURE — 99024 POSTOP FOLLOW-UP VISIT: CPT | Mod: POP,,, | Performed by: THORACIC SURGERY (CARDIOTHORACIC VASCULAR SURGERY)

## 2023-04-12 PROCEDURE — 99999 PR PBB SHADOW E&M-EST. PATIENT-LVL III: CPT | Mod: PBBFAC,,, | Performed by: THORACIC SURGERY (CARDIOTHORACIC VASCULAR SURGERY)

## 2023-04-12 PROCEDURE — 99213 OFFICE O/P EST LOW 20 MIN: CPT | Mod: PBBFAC,25 | Performed by: THORACIC SURGERY (CARDIOTHORACIC VASCULAR SURGERY)

## 2023-04-12 PROCEDURE — 99999 PR PBB SHADOW E&M-EST. PATIENT-LVL III: ICD-10-PCS | Mod: PBBFAC,,, | Performed by: THORACIC SURGERY (CARDIOTHORACIC VASCULAR SURGERY)

## 2023-04-12 PROCEDURE — 99024 PR POST-OP FOLLOW-UP VISIT: ICD-10-PCS | Mod: POP,,, | Performed by: THORACIC SURGERY (CARDIOTHORACIC VASCULAR SURGERY)

## 2023-04-12 PROCEDURE — 71046 XR CHEST PA AND LATERAL: ICD-10-PCS | Mod: 26,,, | Performed by: RADIOLOGY

## 2023-04-12 PROCEDURE — 71046 X-RAY EXAM CHEST 2 VIEWS: CPT | Mod: TC

## 2023-04-12 PROCEDURE — 71046 X-RAY EXAM CHEST 2 VIEWS: CPT | Mod: 26,,, | Performed by: RADIOLOGY

## 2023-04-12 RX ORDER — METOPROLOL SUCCINATE 25 MG/1
25 TABLET, EXTENDED RELEASE ORAL DAILY
COMMUNITY

## 2023-04-14 ENCOUNTER — TELEPHONE (OUTPATIENT)
Dept: CARDIOTHORACIC SURGERY | Facility: CLINIC | Age: 68
End: 2023-04-14
Payer: MEDICARE

## 2023-04-14 DIAGNOSIS — C34.91 NSCLC OF RIGHT LUNG: Primary | ICD-10-CM

## 2023-04-14 RX ORDER — OXYCODONE HYDROCHLORIDE 5 MG/1
5 TABLET ORAL EVERY 4 HOURS PRN
Qty: 28 TABLET | Refills: 0 | Status: SHIPPED | OUTPATIENT
Start: 2023-04-14

## 2023-04-14 NOTE — TELEPHONE ENCOUNTER
----- Message from Dona Anderson sent at 4/14/2023 12:22 PM CDT -----  Regarding: Refill  Contact: Pt @ [phone]  Rx Refill/Request    Is this a Refill or New Rx:Refill    Rx Name and Strength:oxyCODONE (ROXICODONE) 5 MG immediate release tablet    Preferred Pharmacy with phone number:  [facility]  [id number] [address] [address] [phone]    Communication Preference:Pt @ [phone]    Additional Information:

## (undated) DEVICE — CATH THORACIC 24FR ST

## (undated) DEVICE — SOL WATER STRL IRR 1000ML

## (undated) DEVICE — HEMOSTAT SURGICEL NUKNIT 3X4IN

## (undated) DEVICE — COVER LIGHT HANDLE

## (undated) DEVICE — KIT ANTIFOG W/SPONG & FLUID

## (undated) DEVICE — DRESSING TRANS 2X2 TEGADERM

## (undated) DEVICE — LOOP VESSEL BLUE MAXI

## (undated) DEVICE — ELECTRODE BLADE INSULATED 1 IN

## (undated) DEVICE — TUBING SUC UNIV W/CONN 12FT

## (undated) DEVICE — CONTAINER SPECIMEN STRL 4OZ

## (undated) DEVICE — RELOAD SUREFORM 45 2.5 WHT 6R

## (undated) DEVICE — SYR SLIP TIP 20CC

## (undated) DEVICE — Device

## (undated) DEVICE — NDL SPINAL 18GX3.5 SPINOCAN

## (undated) DEVICE — GAUZE SPONGE 4X4 12PLY

## (undated) DEVICE — RELOAD SUREFORM 45 3.5 BLU 6R

## (undated) DEVICE — CLOSURE SKIN STERI STRIP 1/2X4

## (undated) DEVICE — DRAPE SCOPE PILLOW WARMER

## (undated) DEVICE — DRESSING TEGADERM 4.4X5IN

## (undated) DEVICE — TRAY MINOR GEN SURG OMC

## (undated) DEVICE — SEAL UNIVERSAL 5MM-8MM XI

## (undated) DEVICE — DRAPE COLUMN DAVINCI XI

## (undated) DEVICE — ELECTRODE REM PLYHSV RETURN 9

## (undated) DEVICE — SUT SILK 0 BLK BR FSL 18 IN

## (undated) DEVICE — SUT VICRYL 3-0 27 SH

## (undated) DEVICE — DRAIN CHEST DRY SUCTION

## (undated) DEVICE — SUT SILK 0 STRANDS 30IN BLK

## (undated) DEVICE — DRESSING TELFA N ADH 3X8

## (undated) DEVICE — PORT AIRSEAL 12/120MM LPI

## (undated) DEVICE — SET TRI-LUMEN FILTERED TUBE

## (undated) DEVICE — DRESSING SURGICAL 1X3

## (undated) DEVICE — DRAPE ABDOMINAL TIBURON 14X11

## (undated) DEVICE — DRESSING TRANS 4X4 TEGADERM

## (undated) DEVICE — DRAPE ARM DAVINCI XI

## (undated) DEVICE — STAPLER SUREFORM CRVD TIP 45

## (undated) DEVICE — SYR ONLY LUER LOCK 20CC

## (undated) DEVICE — CANNULA REDUCER 12-8MM

## (undated) DEVICE — RELOAD SUREFORM 45 4.3 GRN 6R

## (undated) DEVICE — TAPE CURAD SILK ADH 3INX10YD

## (undated) DEVICE — APPLIER CLIP ENDO LIGAMAX 5MM

## (undated) DEVICE — SUT MCRYL PLUS 4-0 PS2 27IN

## (undated) DEVICE — ADHESIVE MASTISOL VIAL 48/BX

## (undated) DEVICE — DRAPE INCISE IOBAN 2 23X17IN

## (undated) DEVICE — BAG INZII TISS RETRV 12/15MM

## (undated) DEVICE — SPONGE IV DRAIN 4X4 STERILE

## (undated) DEVICE — GLOVE BIOGEL SKINSENSE PI 7.5

## (undated) DEVICE — CANNULA SEAL 12MM

## (undated) DEVICE — GOWN SMART IMP BREATHABLE XXLG